# Patient Record
Sex: FEMALE | Race: WHITE | URBAN - METROPOLITAN AREA
[De-identification: names, ages, dates, MRNs, and addresses within clinical notes are randomized per-mention and may not be internally consistent; named-entity substitution may affect disease eponyms.]

---

## 2017-01-03 ENCOUNTER — HOSPITAL ENCOUNTER (INPATIENT)
Facility: CLINIC | Age: 57
LOS: 6 days | Discharge: HOME | DRG: 885 | End: 2017-01-09
Attending: EMERGENCY MEDICINE | Admitting: PSYCHIATRY & NEUROLOGY
Payer: COMMERCIAL

## 2017-01-03 DIAGNOSIS — R45.89 SUICIDAL BEHAVIOR: ICD-10-CM

## 2017-01-03 DIAGNOSIS — F41.1 GENERALIZED ANXIETY DISORDER: ICD-10-CM

## 2017-01-03 DIAGNOSIS — F33.2 SEVERE EPISODE OF RECURRENT MAJOR DEPRESSIVE DISORDER, WITHOUT PSYCHOTIC FEATURES (H): Primary | ICD-10-CM

## 2017-01-03 DIAGNOSIS — F10.20 ALCOHOL DEPENDENCE, CONTINUOUS (H): ICD-10-CM

## 2017-01-03 PROBLEM — F32.A DEPRESSION WITH SUICIDAL IDEATION: Status: ACTIVE | Noted: 2017-01-03

## 2017-01-03 PROBLEM — R45.851 DEPRESSION WITH SUICIDAL IDEATION: Status: ACTIVE | Noted: 2017-01-03

## 2017-01-03 LAB
ALBUMIN SERPL-MCNC: 4.2 G/DL (ref 3.4–5)
ALP SERPL-CCNC: 72 U/L (ref 40–150)
ALT SERPL W P-5'-P-CCNC: 129 U/L (ref 0–50)
AMPHETAMINES UR QL SCN: ABNORMAL
ANION GAP SERPL CALCULATED.3IONS-SCNC: 13 MMOL/L (ref 3–14)
AST SERPL W P-5'-P-CCNC: 194 U/L (ref 0–45)
BARBITURATES UR QL: ABNORMAL
BASOPHILS # BLD AUTO: 0 10E9/L (ref 0–0.2)
BASOPHILS NFR BLD AUTO: 0.7 %
BENZODIAZ UR QL: ABNORMAL
BILIRUB SERPL-MCNC: 0.6 MG/DL (ref 0.2–1.3)
BUN SERPL-MCNC: 10 MG/DL (ref 7–30)
CALCIUM SERPL-MCNC: 9.2 MG/DL (ref 8.5–10.1)
CANNABINOIDS UR QL SCN: ABNORMAL
CHLORIDE SERPL-SCNC: 100 MMOL/L (ref 94–109)
CO2 SERPL-SCNC: 26 MMOL/L (ref 20–32)
COCAINE UR QL: ABNORMAL
CREAT SERPL-MCNC: 0.74 MG/DL (ref 0.52–1.04)
DIFFERENTIAL METHOD BLD: ABNORMAL
EOSINOPHIL # BLD AUTO: 0.1 10E9/L (ref 0–0.7)
EOSINOPHIL NFR BLD AUTO: 2.2 %
ERYTHROCYTE [DISTWIDTH] IN BLOOD BY AUTOMATED COUNT: 13.5 % (ref 10–15)
GFR SERPL CREATININE-BSD FRML MDRD: 82 ML/MIN/1.7M2
GLUCOSE SERPL-MCNC: 92 MG/DL (ref 70–99)
HCT VFR BLD AUTO: 40.3 % (ref 35–47)
HGB BLD-MCNC: 13.8 G/DL (ref 11.7–15.7)
IMM GRANULOCYTES # BLD: 0 10E9/L (ref 0–0.4)
IMM GRANULOCYTES NFR BLD: 0.2 %
LYMPHOCYTES # BLD AUTO: 1.1 10E9/L (ref 0.8–5.3)
LYMPHOCYTES NFR BLD AUTO: 27 %
MCH RBC QN AUTO: 35.6 PG (ref 26.5–33)
MCHC RBC AUTO-ENTMCNC: 34.2 G/DL (ref 31.5–36.5)
MCV RBC AUTO: 104 FL (ref 78–100)
MONOCYTES # BLD AUTO: 0.6 10E9/L (ref 0–1.3)
MONOCYTES NFR BLD AUTO: 13.6 %
NEUTROPHILS # BLD AUTO: 2.3 10E9/L (ref 1.6–8.3)
NEUTROPHILS NFR BLD AUTO: 56.3 %
NRBC # BLD AUTO: 0 10*3/UL
NRBC BLD AUTO-RTO: 0 /100
OPIATES UR QL SCN: ABNORMAL
PCP UR QL SCN: ABNORMAL
PLATELET # BLD AUTO: 188 10E9/L (ref 150–450)
POTASSIUM SERPL-SCNC: 3.9 MMOL/L (ref 3.4–5.3)
PROT SERPL-MCNC: 7.5 G/DL (ref 6.8–8.8)
RBC # BLD AUTO: 3.88 10E12/L (ref 3.8–5.2)
SODIUM SERPL-SCNC: 139 MMOL/L (ref 133–144)
T4 FREE SERPL-MCNC: 1 NG/DL (ref 0.76–1.46)
TSH SERPL DL<=0.005 MIU/L-ACNC: 4.29 MU/L (ref 0.4–4)
WBC # BLD AUTO: 4 10E9/L (ref 4–11)

## 2017-01-03 PROCEDURE — 84443 ASSAY THYROID STIM HORMONE: CPT | Performed by: PSYCHIATRY & NEUROLOGY

## 2017-01-03 PROCEDURE — 12400000 ZZH R&B MH

## 2017-01-03 PROCEDURE — 99285 EMERGENCY DEPT VISIT HI MDM: CPT | Mod: 25

## 2017-01-03 PROCEDURE — 80053 COMPREHEN METABOLIC PANEL: CPT | Performed by: PSYCHIATRY & NEUROLOGY

## 2017-01-03 PROCEDURE — 25000132 ZZH RX MED GY IP 250 OP 250 PS 637: Performed by: PSYCHIATRY & NEUROLOGY

## 2017-01-03 PROCEDURE — 25000125 ZZHC RX 250: Performed by: PSYCHIATRY & NEUROLOGY

## 2017-01-03 PROCEDURE — 80307 DRUG TEST PRSMV CHEM ANLYZR: CPT | Performed by: EMERGENCY MEDICINE

## 2017-01-03 PROCEDURE — 25000132 ZZH RX MED GY IP 250 OP 250 PS 637: Performed by: EMERGENCY MEDICINE

## 2017-01-03 PROCEDURE — 85025 COMPLETE CBC W/AUTO DIFF WBC: CPT | Performed by: PSYCHIATRY & NEUROLOGY

## 2017-01-03 PROCEDURE — HZ2ZZZZ DETOXIFICATION SERVICES FOR SUBSTANCE ABUSE TREATMENT: ICD-10-PCS | Performed by: PSYCHIATRY & NEUROLOGY

## 2017-01-03 PROCEDURE — 84439 ASSAY OF FREE THYROXINE: CPT | Performed by: PSYCHIATRY & NEUROLOGY

## 2017-01-03 PROCEDURE — 90791 PSYCH DIAGNOSTIC EVALUATION: CPT

## 2017-01-03 PROCEDURE — 36415 COLL VENOUS BLD VENIPUNCTURE: CPT | Performed by: PSYCHIATRY & NEUROLOGY

## 2017-01-03 PROCEDURE — 82075 ASSAY OF BREATH ETHANOL: CPT

## 2017-01-03 RX ORDER — ACETAMINOPHEN 500 MG
1000 TABLET ORAL
Status: DISCONTINUED | OUTPATIENT
Start: 2017-01-03 | End: 2017-01-04

## 2017-01-03 RX ORDER — MULTIPLE VITAMINS W/ MINERALS TAB 9MG-400MCG
1 TAB ORAL DAILY
Status: DISCONTINUED | OUTPATIENT
Start: 2017-01-04 | End: 2017-01-09 | Stop reason: HOSPADM

## 2017-01-03 RX ORDER — IBUPROFEN 200 MG
400-600 TABLET ORAL
Status: COMPLETED | OUTPATIENT
Start: 2017-01-03 | End: 2017-01-03

## 2017-01-03 RX ORDER — LORAZEPAM 1 MG/1
1-4 TABLET ORAL EVERY 30 MIN PRN
Status: DISCONTINUED | OUTPATIENT
Start: 2017-01-03 | End: 2017-01-09 | Stop reason: HOSPADM

## 2017-01-03 RX ORDER — ONDANSETRON 4 MG/1
4 TABLET, FILM COATED ORAL EVERY 8 HOURS PRN
Status: DISCONTINUED | OUTPATIENT
Start: 2017-01-03 | End: 2017-01-09 | Stop reason: HOSPADM

## 2017-01-03 RX ORDER — LISINOPRIL 10 MG/1
10 TABLET ORAL DAILY
Status: DISCONTINUED | OUTPATIENT
Start: 2017-01-04 | End: 2017-01-09 | Stop reason: HOSPADM

## 2017-01-03 RX ORDER — FOLIC ACID 1 MG/1
1 TABLET ORAL DAILY
Status: DISCONTINUED | OUTPATIENT
Start: 2017-01-04 | End: 2017-01-09 | Stop reason: HOSPADM

## 2017-01-03 RX ORDER — LANOLIN ALCOHOL/MO/W.PET/CERES
100 CREAM (GRAM) TOPICAL DAILY
Status: COMPLETED | OUTPATIENT
Start: 2017-01-04 | End: 2017-01-08

## 2017-01-03 RX ORDER — QUETIAPINE FUMARATE 25 MG/1
25-50 TABLET, FILM COATED ORAL EVERY 6 HOURS PRN
Status: DISCONTINUED | OUTPATIENT
Start: 2017-01-03 | End: 2017-01-09 | Stop reason: HOSPADM

## 2017-01-03 RX ORDER — LORAZEPAM 2 MG/ML
1-4 INJECTION INTRAMUSCULAR EVERY 30 MIN PRN
Status: DISCONTINUED | OUTPATIENT
Start: 2017-01-03 | End: 2017-01-09 | Stop reason: HOSPADM

## 2017-01-03 RX ADMIN — LORAZEPAM 1 MG: 1 TABLET ORAL at 22:12

## 2017-01-03 RX ADMIN — ONDANSETRON 4 MG: 4 TABLET, FILM COATED ORAL at 22:12

## 2017-01-03 RX ADMIN — IBUPROFEN 600 MG: 200 TABLET, FILM COATED ORAL at 20:36

## 2017-01-03 NOTE — ED NOTES
Bed: [facility]  Expected date:   Expected time:   Means of arrival:   Comments:  suicidal
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
## 2017-01-03 NOTE — IP AVS SNAPSHOT
[place] [address] [place] [address]    Phone:  [phone]                                       After Visit Summary   1/3/2017    Isabel Fallon    MRN: [id number]           After Visit Summary Signature Page     I have received my discharge instructions, and my questions have been answered. I have discussed any challenges I see with this plan with the nurse or doctor.    ..........................................................................................................................................  Patient/Patient Representative Signature      ..........................................................................................................................................  Patient Representative Print Name and Relationship to Patient    ..................................................               ................................................  Date                                            Time    ..........................................................................................................................................  Reviewed by Signature/Title    ...................................................              ..............................................  Date                                                            Time

## 2017-01-04 PROCEDURE — 25000132 ZZH RX MED GY IP 250 OP 250 PS 637: Performed by: PSYCHIATRY & NEUROLOGY

## 2017-01-04 PROCEDURE — 25000132 ZZH RX MED GY IP 250 OP 250 PS 637: Performed by: PHYSICIAN ASSISTANT

## 2017-01-04 PROCEDURE — 99222 1ST HOSP IP/OBS MODERATE 55: CPT | Mod: AI | Performed by: PHYSICIAN ASSISTANT

## 2017-01-04 PROCEDURE — 12400006 ZZH R&B MH INTERMEDIATE

## 2017-01-04 PROCEDURE — 97150 GROUP THERAPEUTIC PROCEDURES: CPT | Mod: GO

## 2017-01-04 RX ORDER — BUTALBITAL, ACETAMINOPHEN AND CAFFEINE 50; 325; 40 MG/1; MG/1; MG/1
1 TABLET ORAL EVERY 4 HOURS PRN
COMMUNITY
End: 2017-08-11

## 2017-01-04 RX ORDER — BUTALBITAL, ASPIRIN AND CAFFEINE 50; 325; 40 MG/1; MG/1; MG/1
1 TABLET ORAL EVERY 4 HOURS PRN
Status: DISCONTINUED | OUTPATIENT
Start: 2017-01-04 | End: 2017-01-09 | Stop reason: HOSPADM

## 2017-01-04 RX ORDER — MIRTAZAPINE 15 MG/1
15 TABLET, FILM COATED ORAL AT BEDTIME
Status: DISCONTINUED | OUTPATIENT
Start: 2017-01-04 | End: 2017-01-09 | Stop reason: HOSPADM

## 2017-01-04 RX ADMIN — LORAZEPAM 1 MG: 1 TABLET ORAL at 20:49

## 2017-01-04 RX ADMIN — OMEPRAZOLE 20 MG: 20 CAPSULE, DELAYED RELEASE ORAL at 08:38

## 2017-01-04 RX ADMIN — MULTIPLE VITAMINS W/ MINERALS TAB 1 TABLET: TAB at 08:38

## 2017-01-04 RX ADMIN — MIRTAZAPINE 15 MG: 15 TABLET, FILM COATED ORAL at 20:49

## 2017-01-04 RX ADMIN — LORAZEPAM 1 MG: 1 TABLET ORAL at 09:42

## 2017-01-04 RX ADMIN — VORTIOXETINE 5 MG: 5 TABLET, FILM COATED ORAL at 13:59

## 2017-01-04 RX ADMIN — Medication 100 MG: at 08:38

## 2017-01-04 RX ADMIN — LISINOPRIL 10 MG: 10 TABLET ORAL at 08:38

## 2017-01-04 RX ADMIN — FOLIC ACID 1 MG: 1 TABLET ORAL at 08:38

## 2017-01-04 ASSESSMENT — ACTIVITIES OF DAILY LIVING (ADL)
GROOMING: INDEPENDENT
DRESS: SCRUBS (BEHAVIORAL HEALTH)
ORAL_HYGIENE: INDEPENDENT

## 2017-01-04 NOTE — H&P
Pt seen for initial psychiatric evaluation, please see my dictation for details and recommendations.

## 2017-01-04 NOTE — PLAN OF CARE
Problem: Depressive Symptoms  Goal: Depressive Symptoms  Signs and symptoms of listed problems will be absent or manageable.   Outcome: No Change  Pt has been isolative to her room through the majority of the shift. Pt has been fairly guarded but does brighten up upon approach. Pt is concerned about stressors at home especially her . Pt talked about her drinking how this has not helped her. PT is blunt and flat and at time tearful. Pt though is directable and pleasant. Pt to have a CD assessment.

## 2017-01-04 NOTE — PROGRESS NOTES
01/03/17 2213   Patient Belongings   Patient Belongings clothing;other (see comments)   Disposition of Belongings Locker   Belongings Search Yes   Clothing Search Yes   Second Staff Claudio     Jacket  Pajama bottoms w/ strings  Pajama top  Socks  Bra  Underwear  Books x 2      ADMIT  SIGNATURE_______________________________________DATE____________________      DISCHARGE  SIGNATURE_______________________________________DATE____________________

## 2017-01-04 NOTE — CONSULTS
"BEHAVIORAL HEALTH NUTRITION ASSESSMENT      REASON FOR ASSESSMENT:  Admission Nutrition Risk Screen - unintentional weight loss of 10# or more in the past 2 months.     CURRENT DIET AND NOURISHMENT ORDER:    Information obtained from Chart Review.   Pt presents to [facility] for depression with suicidal ideation. Pt has been drinking heavily since September, and regularly drinks wine until she passes out. Suspect etoh intakes have taken place of food intakes.     Diet: Regular    Current Intake/Tolerance: Per physician, pt endorses nausea currently.  No intake recorded on the doc flowsheet. Patient not available/not appropriate to visit with at this time. Patient ordering adequate amounts of food and balanced meals as per review of menus.    ANTHROPOMETRICS:    Height: 5' 6\"  Weight: 62.2 kg  BMI: 22.16 kg/m2  IBW: 59 kg  %IBW: 105%  Weight History: unable to determine recent changes in weight.   Wt Readings from Last 10 Encounters:   01/03/17 62.234 kg (137 lb 3.2 oz)   02/29/12 67 kg (147 lb 11.3 oz)   10/11/11 63.1 kg (139 lb 1.8 oz)     LABS:  Reviewed    NUTRITION STATUS VALIDATION:  Weight status: Normal BMI  Patient does not meet two of the following criteria necessary for diagnosing malnutrition (significant weight loss, reduced intake, subcutaneous fat loss, muscle loss or fluid retention)    INTERVENTION:    Nutrition Diagnosis:  No nutrition diagnosis at this time.    Implementation:   Nutrition education: Not appropriate at this time due to patient condition    Follow Up/Monitoring:   No need for further follow-up unless another consult received.    Tara Fowler, RD, LD  "
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
## 2017-01-04 NOTE — PROGRESS NOTES
Pt was admitted through [facility] ED on a voluntary status.  Endorses passive SI and increasing anxiety.  Pt presents as labile, depressed and extremely anxious.  Pt feels thing have been declining since  with her  several months ago and has been drinking since.  Reports drinking wine daily until she passes out.  Pt is actively seeking help.  She reports that hugs will help her to feel better. Hx PTSD, TBI, and anxiety. Verbally contracts for safety while on the unit.    Welcome packet reviewed with patient. Information reviewed includes getting emergency help, preventing infections, understanding your care, using medication safely, reducing falls, preventing pressure ulcers, smoking cessation, powerful choices and Patients Bill of Rights. Pt. given tour of the unit and instruction on use of facility including emergency call light. Program schedule reviewed with patient. Questions regarding the unit addressed. Pt. Search completed and belongings inventoried. Nursing assessment complete including patient and medication profiles. Risk assessments completed addressing suicide,fall,skin,nutrition and safety issues. Care plan initiated. Assessments reviewed with physician and admit orders received.
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
## 2017-01-04 NOTE — PLAN OF CARE
"Problem: General Rehab Plan of Care  Goal: Occupational Therapy Goals  The patient and/or their representative will achieve their patient-specific goals related to the plan of care.  The patient-specific goals include:  INITIAL O.T. ASSESSMENT   Details:  Pt was invited to OT group this morning but declined stating, \"I'm going through alcohol withdrawal.\" Her response was quite honest and showed some insight into her current condition. Pt did attend OT Life Skills group. She appeared uncomfortable during group and primarily observed. She did not engage in group discussion but did occasionally nod in agreement. Pt took part in group mindfulness exercise, following directions accurately. Behavior appeared organized and goal directed. Pt solved minor problem she encountered easily. Pt was alert and appeared attentive during group, though perhaps distracted occasionally by her discomfort.         "

## 2017-01-04 NOTE — ED NOTES
Pt given more water to drink. Ambulated to bathroom with steady gait but c/o dizziness. C/o HA that pt is concerned will get worse. Unsure of medication she normally takes for it.

## 2017-01-05 PROCEDURE — 12400006 ZZH R&B MH INTERMEDIATE

## 2017-01-05 PROCEDURE — 25000132 ZZH RX MED GY IP 250 OP 250 PS 637: Performed by: PHYSICIAN ASSISTANT

## 2017-01-05 PROCEDURE — 90791 PSYCH DIAGNOSTIC EVALUATION: CPT

## 2017-01-05 PROCEDURE — 90853 GROUP PSYCHOTHERAPY: CPT

## 2017-01-05 PROCEDURE — 25000132 ZZH RX MED GY IP 250 OP 250 PS 637: Performed by: PSYCHIATRY & NEUROLOGY

## 2017-01-05 RX ADMIN — MIRTAZAPINE 15 MG: 15 TABLET, FILM COATED ORAL at 20:51

## 2017-01-05 RX ADMIN — LORAZEPAM 1 MG: 1 TABLET ORAL at 14:46

## 2017-01-05 RX ADMIN — FOLIC ACID 1 MG: 1 TABLET ORAL at 08:00

## 2017-01-05 RX ADMIN — OMEPRAZOLE 20 MG: 20 CAPSULE, DELAYED RELEASE ORAL at 08:00

## 2017-01-05 RX ADMIN — LISINOPRIL 10 MG: 10 TABLET ORAL at 08:00

## 2017-01-05 RX ADMIN — LORAZEPAM 1 MG: 1 TABLET ORAL at 08:32

## 2017-01-05 RX ADMIN — MULTIPLE VITAMINS W/ MINERALS TAB 1 TABLET: TAB at 08:00

## 2017-01-05 RX ADMIN — VORTIOXETINE 5 MG: 5 TABLET, FILM COATED ORAL at 08:00

## 2017-01-05 RX ADMIN — Medication 100 MG: at 08:00

## 2017-01-05 ASSESSMENT — ACTIVITIES OF DAILY LIVING (ADL)
GROOMING: INDEPENDENT
DRESS: STREET CLOTHES
LAUNDRY: WITH SUPERVISION
ORAL_HYGIENE: INDEPENDENT
ORAL_HYGIENE: INDEPENDENT
DRESS: STREET CLOTHES
GROOMING: INDEPENDENT
LAUNDRY: WITH SUPERVISION
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
## 2017-01-05 NOTE — PLAN OF CARE
Problem: Depressive Symptoms  Goal: Depressive Symptoms  Signs and symptoms of listed problems will be absent or manageable.   Patient scores on CIWA x2 mainly for anxiety she states she has a headache but is due to another peers loud intrusive behavior. Pt has been attending groups and denies SI. Pt's daughter called and states that her mother text someone while she was intoxicated and said she had 3 days picked to commit suicide and the first day was this Saturday. Pt was asked about this and states that this was her intoxications and she does not feel that way now and is more hopeful and goal directed that she is getting the help she needs. She has follow up in place and wants to do O.P. CD ( assessment still pending) Pt's daughter Massiel would like a family meeting before discharge and pt is in agreement to this. Note left for MD. Her stressors are pending divorce and  is asking for spousal maintainace

## 2017-01-05 NOTE — PROGRESS NOTES
"SPIRITUAL HEALTH SERVICES Progress Note  [facility]    Initial visit per pt request.  Pt shared many details about her life related to her: current divorce proceedings, \"enabling\" behavior, use of alcohol for coping, work ethic and rickey.  Pt states an appreciation for support/counse that is rickey-based (including scripture verses/stories).  Pt said she likes her current therapist, and also engaged this conversation with openness to continue to explore her own behavior and its effectiveness.     provided input on co-dependency and alcohol use/abuse aimed at encouraging pt to seek out support in the appropriate recovery setting (potentially to include participation 12-Step groups).   provided several scriptural references aimed at helping pt to trust in God's support and guidance through all of the above named concerns.  And, per pt's comments about her divorce, SH invited pt to consider that being caring/faithful can include pursuing a fair/just divorce settlement.   closed this visit w prayer; and remains available per pt need/request.                                                                                                                                           Gilmer Guillermo M.Div., [facility]  Staff   Pager [phone]    "

## 2017-01-05 NOTE — PLAN OF CARE
Problem: Discharge Planning  Goal: Discharge Planning (Adult, OB, Behavioral, Peds)  Patient attended Process Group and participated appropriately. Patient demonstrated good insight into the topic of discussion.

## 2017-01-05 NOTE — PLAN OF CARE
Problem: Depressive Symptoms  Goal: Depressive Symptoms  Signs and symptoms of listed problems will be absent or manageable.   Outcome: No Change  Pt was pleased to have a visitor this evening. Pt has episodes of light sweating and has needed a change of scrubs, but declined shower. CIWA 4 and received lorazepam 1mg. Fine hand and tongue tremor present; states withdrawal is uncomfortable. Pt expressed guilt feelings about ETOH withdrawal.  Isolative, reading in room , is pleasant and polite.

## 2017-01-05 NOTE — PROGRESS NOTES
[facility] Psychiatric Progress Note      Interval History:   Pt seen, team meeting held with , nursing staff, OT, and PA's to review diagnosis and treatment plan. Patient reports she is doing a tad better but she continues to report discomfort with regard to her alcohol withdrawal. She denies current self harm thoughts or plans. She says she enjoyed meeting with the  today but she is looking forward to her outpatient care. She says she does not think the groups are very beneficial and so she is looking forward to reconnecting with her outpatient providers. She has not talked about having any plans to end her life on any of the dates reported prior to admission. She says she just cannot get herself to agree to pay spousal support to her . Tolerating medications well without significant side effects. She is yet to be seen by CD.       Review of systems:   The Review of Systems is negative other than noted in the HPI     Medications:       mirtazapine  15 mg Oral At Bedtime     vortioxetine  5 mg Oral Daily     thiamine  100 mg Oral Daily     folic acid  1 mg Oral Daily     multivitamin, therapeutic with minerals  1 tablet Oral Daily     lisinopril  10 mg Oral Daily     omeprazole  20 mg Oral Daily     butalbital-aspirin-caffeine, LORazepam **OR** LORazepam, QUEtiapine, ondansetron (ZOFRAN) tablet 4 mg    Mental Status Examination:     Appearance:  awake, alert, adequately groomed and casually dressed  Eye Contact:  better  Speech:  clear, coherent  Psychomotor Behavior:  no evidence of tardive dyskinesia, dystonia, or tics and fidgeting  Mood:  Sad and anxious.  Affect:  Flat and restricted in range and intensity is normal  Thought Process:  logical, linear and goal oriented no loose associations  Thought Content:  no evidence of suicidal ideation or homicidal ideation   Oriented to:  time, person, and place  Attention Span and Concentration:  limited  Recent and Remote  Memory:  limited  Fund of Knowledge: appropriate  Muscle Strength and Tone: normal  Gait and Station: Normal  Insight:  fair  Judgment:  limited      Labs/Vitals:   No results found for this or any previous visit (from the past 24 hour(s)).  B/P: 120/81, T: 97.6, P: 83, R: 15    Impression:   Isabel Fallon is a 56-year-old woman with multiple psychosocial stressors who presents on account of entertaining self-harm thoughts in the context of multiple stressors, including separation from .           DIagnoses:     1.  Major depressive disorder, recurrent, severe, without psychotic features.    2.  Posttraumatic stress disorder, chronic.    3.  Alcohol use disorder, severe, with alcohol withdrawal.    4.  History of traumatic brain injury.    5.  Hypertension.    6.  Gastroesophageal reflux disease.    7.  Chronic headaches.           Plan:   1. Written information given on medications. Side effects, risks, benefits reviewed.  2. CD assessment.  3. Maintain current medications as ordered.  4. Continue hospitalization      Attestation:  Patient has been seen and evaluated by Annabel dickson MD    PATIENT ID  Name: Isabel Fallon  MRN:[id number]  YOB: 1960

## 2017-01-06 PROCEDURE — 97150 GROUP THERAPEUTIC PROCEDURES: CPT | Mod: GO

## 2017-01-06 PROCEDURE — 25000132 ZZH RX MED GY IP 250 OP 250 PS 637: Performed by: PSYCHIATRY & NEUROLOGY

## 2017-01-06 PROCEDURE — 90853 GROUP PSYCHOTHERAPY: CPT

## 2017-01-06 PROCEDURE — 25000132 ZZH RX MED GY IP 250 OP 250 PS 637: Performed by: PHYSICIAN ASSISTANT

## 2017-01-06 PROCEDURE — 12400000 ZZH R&B MH

## 2017-01-06 RX ORDER — HYDROXYZINE HYDROCHLORIDE 25 MG/1
25-50 TABLET, FILM COATED ORAL EVERY 4 HOURS PRN
Status: DISCONTINUED | OUTPATIENT
Start: 2017-01-06 | End: 2017-01-09 | Stop reason: HOSPADM

## 2017-01-06 RX ORDER — SENNOSIDES 8.6 MG
8.6 TABLET ORAL 2 TIMES DAILY PRN
Status: DISCONTINUED | OUTPATIENT
Start: 2017-01-06 | End: 2017-01-09 | Stop reason: HOSPADM

## 2017-01-06 RX ADMIN — OMEPRAZOLE 20 MG: 20 CAPSULE, DELAYED RELEASE ORAL at 08:17

## 2017-01-06 RX ADMIN — QUETIAPINE FUMARATE 25 MG: 25 TABLET, FILM COATED ORAL at 09:25

## 2017-01-06 RX ADMIN — FOLIC ACID 1 MG: 1 TABLET ORAL at 08:17

## 2017-01-06 RX ADMIN — VORTIOXETINE 5 MG: 5 TABLET, FILM COATED ORAL at 08:17

## 2017-01-06 RX ADMIN — Medication 100 MG: at 08:17

## 2017-01-06 RX ADMIN — MULTIPLE VITAMINS W/ MINERALS TAB 1 TABLET: TAB at 08:17

## 2017-01-06 RX ADMIN — MIRTAZAPINE 15 MG: 15 TABLET, FILM COATED ORAL at 21:29

## 2017-01-06 RX ADMIN — LISINOPRIL 10 MG: 10 TABLET ORAL at 08:17

## 2017-01-06 NOTE — PROGRESS NOTES
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 specific to language, age, gender, culture, Nondenominational preference, race, ethnicity, sexual orientation or disability?  No    What particular treatment choices and options would you like to have? outpatient    Do you have a preference for a particular treatment program? outpatient    Criteria for Diagnosis     Criteria for Diagnosis  DSM-5 Criteria for Substance Use Disorder  Instructions: Determine whether the client currently meets the criteria for Substance Use Disorder using the diagnostic criteria in the DSM-V pp.481-584. Current means during the most recent 12 months outside a facility that controls access to substances    Category of Substance Severity (ICD-10 Code / DSM 5 Code)     Alcohol Use Disorder Moderate  (F10.20) (303.90)   Cannabis Use Disorder NA   Hallucinogen Use Disorder NA   Inhalant Use Disorder NA   Opioid Use Disorder NA   Sedative, Hypnotic, or Anxiolytic Use Disorder NA   Stimulant Related Disorder NA   Tobacco Use Disorder NA   Other (or unknown) Substance Use Disorder NA       Collateral Contact Summary   Number of contacts made: 1    Contact with referring person:  Yes.    If court related records were reviewed, summarize here: NA    Information from collateral  contacts supported/largely agreed with information from the client and associated risk ratings.      Rule 25 Assessment Summary and Plan   's Recommendation    Patient is recommended to attend a co-occurring outpatient treatment program.      Collateral Contacts     Name:    [facility]   Relationship:    Medical records   Phone Number:     Releases:         EMR was reviewed and discussed care plan with staff.      Collateral Contacts     Name:    n/a   Relationship:       Phone Number:       Releases:             ollateral Contacts      A problematic pattern of alcohol/drug use leading to clinically significant impairment or distress, as manifested by at least two of the following, occurring within a 12-month period:    Alcohol/drug is often taken in larger amounts or over a longer period than was intended.  A great deal of time is spent in activities necessary to obtain alcohol, use alcohol, or recover from its effects.  Continued alcohol use despite having persistent or recurrent social or interpersonal problems caused or exacerbated by the effects of alcohol/drug.  Alcohol/drug use is continued despite knowledge of having a persistent or recurrent physical or psychological problem that is likely to have been caused or exacerbated by alcohol.  Tolerance, as defined by either of the following: A need for markedly increased amounts of alcohol/drug to achieve intoxication or desired effect. and A markedly diminished effect with continued use of the same amount of alcohol/drug.      Specify if: In early remission:  After full criteria for alcohol/drug use disorder were previously met, none of the criteria for alcohol/drug use disorder have been met for at least 3 months but for less than 12 months (with the exception that Criterion A4,  Craving or a strong desire or urge to use alcohol/drug  may be met).     In sustained remission:   After full criteria for alcohol use disorder were previously  met, non of the criteria for alcohol/drug use disorder have been met at any time during a period of 12 months or longer (with the exception that Criterion A4,  Craving or strong desire or urge to use alcohol/drug  may be met).   Specify if:   This additional specifier is used if the individual is in an environment where access to alcohol is restricted.    Mild: Presence of 2-3 symptoms    Moderate: Presence of 4-5 symptoms    Severe: Presence of 6 or more symptoms

## 2017-01-06 NOTE — PLAN OF CARE
Problem: Depressive Symptoms  Goal: Depressive Symptoms  Signs and symptoms of listed problems will be absent or manageable.   Patient does not score on CIWA and states she feels much better being over on this side. Pt pleasant ,no c/o headache as on day shift. Adjusting well to SDU and has good structure for DC. Family meeting requested before DC

## 2017-01-06 NOTE — PLAN OF CARE
Problem: Discharge Planning  Goal: Discharge Planning (Adult, OB, Behavioral, Peds)   called and spoke with patient's daughter, Massiel Addison ([phone]) regarding attending a family meeting on Monday 01/09/16 at 1000. She stated that she runs an in-home  so she would not be able to attend. She is requesting that physician call and talk to her on Monday instead.

## 2017-01-06 NOTE — PROGRESS NOTES
[facility] Psychiatric Progress Note      Interval History:   Pt seen, team meeting held with , nursing staff, OT, and PA's to review diagnosis and treatment plan. Patient reports she is feeling very anxious this morning but she denies that she is tremulous or having any alcohol withdrawal symptoms. She is yet to get her CD assessment.  She is sad and depressed. Tolerating medications well without significant side effects. She denies self harm thoughts, plans or intent. Her daughter is requesting a discharge family meeting. Discussed the option of MI/CD IOP referral.     Review of systems:   The Review of Systems is negative other than noted in the HPI     Medications:       [START ON 1/7/2017] vortioxetine  10 mg Oral Daily     mirtazapine  15 mg Oral At Bedtime     thiamine  100 mg Oral Daily     folic acid  1 mg Oral Daily     multivitamin, therapeutic with minerals  1 tablet Oral Daily     lisinopril  10 mg Oral Daily     omeprazole  20 mg Oral Daily     hydrOXYzine, butalbital-aspirin-caffeine, LORazepam **OR** LORazepam, QUEtiapine, ondansetron (ZOFRAN) tablet 4 mg    Mental Status Examination:     Appearance:  awake, alert, adequately groomed and casually dressed  Eye Contact:  better  Speech:  clear, coherent  Psychomotor Behavior:  no evidence of tardive dyskinesia, dystonia, or tics and fidgeting. Tearful.  Mood:  Sad and anxious.  Affect:  Flat and restricted in range and intensity is normal  Thought Process:  logical, linear and goal oriented no loose associations  Thought Content:  no evidence of suicidal ideation or homicidal ideation   Oriented to:  time, person, and place  Attention Span and Concentration:  limited  Recent and Remote Memory:  limited  Fund of Knowledge: appropriate  Muscle Strength and Tone: normal  Gait and Station: Normal  Insight:  fair  Judgment:  limited      Labs/Vitals:   No results found for this or any previous visit (from the past 24  hour(s)).  B/P: 120/81, T: 97.6, P: 83, R: 15    Impression:   Isabel Fallon is a 56-year-old woman with multiple psychosocial stressors who presents on account of entertaining self-harm thoughts in the context of multiple stressors, including separation from .           DIagnoses:     1.  Major depressive disorder, recurrent, severe, without psychotic features.    2.  Posttraumatic stress disorder, chronic.    3.  Alcohol use disorder, severe, with alcohol withdrawal.    4.  History of traumatic brain injury.    5.  Hypertension.    6.  Gastroesophageal reflux disease.    7.  Chronic headaches.           Plan:   1. Written information given on medications. Side effects, risks, benefits reviewed.  2. CD assessment.  3. Increase Trintellix to 10 mg daily.  4. Continue hospitalization      Attestation:  Patient has been seen and evaluated by me,  Annabel Choe MD    PATIENT ID  Name: Isabel Fallon  MRN:[id number]  YOB: 1960

## 2017-01-06 NOTE — CONSULTS
1/6/16 cd consult acknowledged.  Recommending outpatient treatment which patient is agreeable.  Spoke w/ Dr. Choe and patient regarding referral for [facility].
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
## 2017-01-06 NOTE — PLAN OF CARE
"Problem: Depressive Symptoms  Goal: Depressive Symptoms  Signs and symptoms of listed problems will be absent or manageable.   Outcome: No Change  Patient presents as anxious and depressed. She stated \"I am feeling very anxious\" ans she discussed missing her family members. She had a CD assessment this afternoon. Patient appears sad and was tearful at times. She attended groups and participated minimally. Patient required  reassurance and started to feel better this afternoon. Overall she is pleasant and cooperative.         "

## 2017-01-07 PROCEDURE — 25000132 ZZH RX MED GY IP 250 OP 250 PS 637: Performed by: PSYCHIATRY & NEUROLOGY

## 2017-01-07 PROCEDURE — 25000132 ZZH RX MED GY IP 250 OP 250 PS 637: Performed by: PHYSICIAN ASSISTANT

## 2017-01-07 PROCEDURE — 12400000 ZZH R&B MH

## 2017-01-07 RX ADMIN — LISINOPRIL 10 MG: 10 TABLET ORAL at 08:02

## 2017-01-07 RX ADMIN — MIRTAZAPINE 15 MG: 15 TABLET, FILM COATED ORAL at 22:14

## 2017-01-07 RX ADMIN — Medication 100 MG: at 08:02

## 2017-01-07 RX ADMIN — VORTIOXETINE 10 MG: 10 TABLET, FILM COATED ORAL at 08:02

## 2017-01-07 RX ADMIN — FOLIC ACID 1 MG: 1 TABLET ORAL at 08:02

## 2017-01-07 RX ADMIN — OMEPRAZOLE 20 MG: 20 CAPSULE, DELAYED RELEASE ORAL at 08:02

## 2017-01-07 RX ADMIN — QUETIAPINE FUMARATE 50 MG: 25 TABLET, FILM COATED ORAL at 11:50

## 2017-01-07 RX ADMIN — QUETIAPINE FUMARATE 50 MG: 25 TABLET, FILM COATED ORAL at 18:14

## 2017-01-07 RX ADMIN — MULTIPLE VITAMINS W/ MINERALS TAB 1 TABLET: TAB at 08:02

## 2017-01-07 ASSESSMENT — ACTIVITIES OF DAILY LIVING (ADL)
LAUNDRY: WITH SUPERVISION
ORAL_HYGIENE: INDEPENDENT
DRESS: STREET CLOTHES
GROOMING: INDEPENDENT

## 2017-01-07 NOTE — PLAN OF CARE
Problem: Depressive Symptoms  Goal: Depressive Symptoms  Signs and symptoms of listed problems will be absent or manageable.   Outcome: Improving  Pt has been spending manjit out in the lounge and socializing more with her peers. Pt attended some of the groups but was disappointed today in one group in which she felt it was not helpful and she felt like she did not learn anything from them. Pt was encouraged to continue to go to groups and look to finding some supportive things in the groups. Pt was also explained that groups are often used to help staff assess the functionality of a person and whether they are capable of integrating and socializing in a structured format. Pt did understand this and explained her DC plan. Pt wants to use AA to address her cd issues however has never used AA nor does she understand what AA is. Pt was explained about the importance of meeting and encouraging her to read the big book. Pt was also thinking she may need more 1:1 CD tx. Pt was encourage to focus on simple tasks in her life and work on herself and ways to self care. Pt does have motivation and insight. Pt did have a good visit what friends and was happy to see her dog., Pt stated that she is glad that her dog is doing well and is recovering well. This is a relief for her as  The pt is very attached to her dog. Pt to continue with AD tx and follow up at [facility] along with psychiatry at [place].

## 2017-01-07 NOTE — PLAN OF CARE
Problem: Depressive Symptoms  Goal: Depressive Symptoms  Signs and symptoms of listed problems will be absent or manageable.   Patient presents depressed and anxious. She stated that she feels overwhelmed about what the doctor's recommendations will be once she leaves. Patient stated that she does not feel she needs inpatient treat or intensive outpatient as this would restrict her from working. Patient stated that if she couldn't return to work after discharge then she would never get better and it would make her anxiety significantly worse. Patient denies chemical dependency issues. Patient was cooperative with care plan and pleasant with staff.
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
## 2017-01-08 PROCEDURE — 90853 GROUP PSYCHOTHERAPY: CPT

## 2017-01-08 PROCEDURE — 25000132 ZZH RX MED GY IP 250 OP 250 PS 637: Performed by: PSYCHIATRY & NEUROLOGY

## 2017-01-08 PROCEDURE — 25000132 ZZH RX MED GY IP 250 OP 250 PS 637: Performed by: PHYSICIAN ASSISTANT

## 2017-01-08 PROCEDURE — 12400000 ZZH R&B MH

## 2017-01-08 RX ADMIN — VORTIOXETINE 10 MG: 10 TABLET, FILM COATED ORAL at 08:28

## 2017-01-08 RX ADMIN — MULTIPLE VITAMINS W/ MINERALS TAB 1 TABLET: TAB at 08:27

## 2017-01-08 RX ADMIN — OMEPRAZOLE 20 MG: 20 CAPSULE, DELAYED RELEASE ORAL at 08:27

## 2017-01-08 RX ADMIN — LISINOPRIL 10 MG: 10 TABLET ORAL at 08:27

## 2017-01-08 RX ADMIN — FOLIC ACID 1 MG: 1 TABLET ORAL at 08:27

## 2017-01-08 RX ADMIN — Medication 100 MG: at 08:27

## 2017-01-08 RX ADMIN — MIRTAZAPINE 15 MG: 15 TABLET, FILM COATED ORAL at 22:00

## 2017-01-08 ASSESSMENT — ACTIVITIES OF DAILY LIVING (ADL)
GROOMING: INDEPENDENT
ORAL_HYGIENE: INDEPENDENT
DRESS: INDEPENDENT
LAUNDRY: WITH SUPERVISION

## 2017-01-08 NOTE — PLAN OF CARE
Problem: Depressive Symptoms  Goal: Depressive Symptoms  Signs and symptoms of listed problems will be absent or manageable.   Patient has been social with peers. She presents with full range affect. C/o anxiety. Denies Si and SIB. Feeling confident abut outpatient treatment at NH.

## 2017-01-08 NOTE — PROGRESS NOTES
"SPIRITUAL HEALTH SERVICES Progress Note  [facility]    F/U visit.  Pt attended the Sunday Spirituality group led by , then asked to speak with SH 1:1.  Pt shared her preference for out-patient CD treatment rather than inpatient, and reasoned that the former could both allow her to return to work and to \"prove herself\" to family.  SH emphasized that engaging recovery work in either setting more than \"proves\" her desire to develop new/healthier coping habits.  Per pt's rickey, SH also emphasized that God will join/support her recovery work in either setting.   also provided prayer as requested by pt.                                                                                                                                           Gilmer Guillermo M.Div., [facility]  Staff   Pager [phone]    "

## 2017-01-08 NOTE — PLAN OF CARE
Problem: Depressive Symptoms  Goal: Depressive Symptoms  Signs and symptoms of listed problems will be absent or manageable.   Outcome: No Change  Pt has been present on the unit and has been spending time in the lounge and socializing with peers. Pt  has been pleasant and cooperative through out the shift. Pt is hopeful for DC Monday

## 2017-01-09 VITALS
DIASTOLIC BLOOD PRESSURE: 89 MMHG | RESPIRATION RATE: 16 BRPM | WEIGHT: 137.2 LBS | OXYGEN SATURATION: 98 % | HEART RATE: 96 BPM | BODY MASS INDEX: 22.05 KG/M2 | HEIGHT: 66 IN | TEMPERATURE: 98 F | SYSTOLIC BLOOD PRESSURE: 118 MMHG

## 2017-01-09 PROCEDURE — 25000132 ZZH RX MED GY IP 250 OP 250 PS 637: Performed by: PHYSICIAN ASSISTANT

## 2017-01-09 PROCEDURE — 90847 FAMILY PSYTX W/PT 50 MIN: CPT

## 2017-01-09 PROCEDURE — 25000132 ZZH RX MED GY IP 250 OP 250 PS 637: Performed by: PSYCHIATRY & NEUROLOGY

## 2017-01-09 PROCEDURE — 90853 GROUP PSYCHOTHERAPY: CPT

## 2017-01-09 RX ORDER — HYDROXYZINE HYDROCHLORIDE 25 MG/1
25-50 TABLET, FILM COATED ORAL EVERY 8 HOURS PRN
Qty: 120 TABLET | Refills: 0 | Status: SHIPPED | OUTPATIENT
Start: 2017-01-09 | End: 2017-02-08

## 2017-01-09 RX ORDER — DISULFIRAM 250 MG/1
250 TABLET ORAL DAILY
Qty: 30 TABLET | Refills: 0 | Status: SHIPPED | OUTPATIENT
Start: 2017-01-09 | End: 2017-02-08

## 2017-01-09 RX ORDER — QUETIAPINE FUMARATE 25 MG/1
25 TABLET, FILM COATED ORAL EVERY 6 HOURS PRN
Qty: 60 TABLET | Refills: 0 | Status: SHIPPED | OUTPATIENT
Start: 2017-01-09 | End: 2017-08-11

## 2017-01-09 RX ORDER — MIRTAZAPINE 15 MG/1
15 TABLET, FILM COATED ORAL AT BEDTIME
Qty: 30 TABLET | Refills: 0 | Status: SHIPPED | OUTPATIENT
Start: 2017-01-09 | End: 2017-08-11

## 2017-01-09 RX ADMIN — QUETIAPINE FUMARATE 50 MG: 25 TABLET, FILM COATED ORAL at 09:08

## 2017-01-09 RX ADMIN — LISINOPRIL 10 MG: 10 TABLET ORAL at 08:10

## 2017-01-09 RX ADMIN — FOLIC ACID 1 MG: 1 TABLET ORAL at 08:10

## 2017-01-09 RX ADMIN — MULTIPLE VITAMINS W/ MINERALS TAB 1 TABLET: TAB at 08:10

## 2017-01-09 RX ADMIN — VORTIOXETINE 10 MG: 10 TABLET, FILM COATED ORAL at 08:10

## 2017-01-09 RX ADMIN — OMEPRAZOLE 20 MG: 20 CAPSULE, DELAYED RELEASE ORAL at 08:10

## 2017-01-09 NOTE — DISCHARGE INSTRUCTIONS
Behavioral Discharge Planning and Instructions    Summary: Admitted to hospital with suicidal ideation.    Main Diagnosis:      Major Treatments, Procedures and Findings: psychiatric assessment    Symptoms to Report: feeling more aggressive, losing more sleep, mood getting worse or thoughts of suicide    Lifestyle Adjustment: Develop and follow safety plan. Follow through with therapy and mdication management . Follow up with [facility] as recommended by .    Psychiatry Follow-up:     Program intake on Tuesday 1/10/17 @ 9 am. Bring insurance card and ID.  [facility]  [address]  [place] [address]  [phone] fax: [phone]    Adarsh Malin, CNS- appointment on Monday 1/30/17 @ 1:30 for paperwork and 2 pm for medication management interview.  [facility]  [address] [place] [address]  [phone]  Fax: [phone]    Juan Miguel Jara, therapist- appointment on Wednesday 1/11/17 @ 4 pm.  Nori Fuller, therapist - appointment on Friday 1/13/17 @ 2:45 pm.  [facility]  [address]  [place] [address]  [phone] fax: [phone]    Resources:   Crisis Intervention: 824.171.6287 or 933-824-2437 (TTY: 864.137.7448).  Call anytime for help.  National [place] on Mental Illness (www.[place].jian.org): 275.515.8060 or 062-559-6604.  Alcoholics Anonymous (www.alcoholics-anonymous.org): Check your phone book for your local chapter.  National Suicide Prevention Line (www.mentalhealthmn.org): 218-096-AFHU (5321)    General Medication Instructions:   See your medication sheet(s) for instructions.   Take all medicines as directed.  Make no changes unless your doctor suggests them.   Go to all your doctor visits.  Be sure to have all your required lab tests. This way, your medicines can be refilled on time.  Do not use any drugs not prescribed by your doctor.  Avoid alcohol.

## 2017-01-09 NOTE — PLAN OF CARE
"Problem: Depressive Symptoms  Goal: Depressive Symptoms  Signs and symptoms of listed problems will be absent or manageable.   Outcome: Improving  During 1:1 Pt mentioned to staff her family will be able to attend family meeting tomorrow am. Pt would like to talk with MD prior to meeting to discuss her plan of care. Pt feels she is \"in a good place\" and feels ready for discharge. Pt wants to start MICD program asap to avoid any regression. Mood appears bright, attitude positive. No s/s or etoh withdrawal, not scoring on ciwa.        "
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
## 2017-01-09 NOTE — PROGRESS NOTES
Patient denies suicidal ideation.  Patient has a copy of discharge instructions and verbalizes understanding of them.  Discharging to home with family tonight.  Discharged to home with sister at 1700.

## 2017-01-11 NOTE — PLAN OF CARE
Problem: Discharge Planning  Goal: Discharge Planning (Adult, OB, Behavioral, Peds)  Attended process group on 1/9/17. Participation complete and appropriate.
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
## 2017-08-10 ENCOUNTER — HOSPITAL ENCOUNTER (INPATIENT)
Facility: CLINIC | Age: 57
LOS: 7 days | Discharge: HOME | End: 2017-08-18
Attending: EMERGENCY MEDICINE | Admitting: PSYCHIATRY & NEUROLOGY
Payer: COMMERCIAL

## 2017-08-10 ENCOUNTER — HOSPITAL ENCOUNTER (EMERGENCY)
Facility: CLINIC | Age: 57
End: 2017-08-10

## 2017-08-10 ENCOUNTER — TRANSFERRED RECORDS (OUTPATIENT)
Dept: HEALTH INFORMATION MANAGEMENT | Facility: CLINIC | Age: 57
End: 2017-08-10

## 2017-08-10 DIAGNOSIS — F32.A DEPRESSION WITH SUICIDAL IDEATION: Primary | ICD-10-CM

## 2017-08-10 DIAGNOSIS — F10.220 ACUTE ALCOHOLIC INTOXICATION IN ALCOHOLISM, UNCOMPLICATED (H): ICD-10-CM

## 2017-08-10 DIAGNOSIS — F33.9 EPISODE OF RECURRENT MAJOR DEPRESSIVE DISORDER, UNSPECIFIED DEPRESSION EPISODE SEVERITY (H): ICD-10-CM

## 2017-08-10 DIAGNOSIS — R11.0 NAUSEA: ICD-10-CM

## 2017-08-10 DIAGNOSIS — R45.851 DEPRESSION WITH SUICIDAL IDEATION: Primary | ICD-10-CM

## 2017-08-10 LAB — ALCOHOL BREATH TEST: 0.27 (ref 0–0.01)

## 2017-08-10 PROCEDURE — 90791 PSYCH DIAGNOSTIC EVALUATION: CPT

## 2017-08-10 PROCEDURE — 99285 EMERGENCY DEPT VISIT HI MDM: CPT

## 2017-08-10 NOTE — IP AVS SNAPSHOT
MRN:[id number]                      After Visit Summary   8/10/2017    Isabel Aviles    MRN: [id number]           Thank you!     Thank you for choosing [facility] for your care. Our goal is always to provide you with excellent care.        Patient Information     Date Of Birth          1960        Designated Caregiver       Most Recent Value    Caregiver    Will someone help with your care after discharge? yes    Name of designated caregiver Bernice    Phone number of caregiver [phone]    Caregiver address NA      About your hospital stay     You were admitted on:  August 11, 2017 You last received care in the:  [facility]    You were discharged on:  August 18, 2017       Who to Call     For medical emergencies, please call 911.  For non-urgent questions about your medical care, please call your primary care provider or clinic, [phone]          Attending Provider     Provider Specialty    Trierweiler, Chad A, MD Emergency Medicine    [facility] Medicine    [facility]iatry       Primary Care Provider Office Phone # Fax #    Dee Dee Miki Gallegos [phone]      Further instructions from your care team         Behavioral Discharge Planning and Instructions    Summary:  Admitted with worsening depression     Main Diagnosis:  Major Depression, recurrent, severe; PTSD; Alcohol Dependence; TBI by history    Major Treatments, Procedures and Findings:   Psychiatric assessment. Chemical dependency assessment. Medication adjustment.     Symptoms to Report: Losing more sleep, Mood getting worse or Thoughts of suicide    Lifestyle Adjustment:  Follow all treatment recommendations. Develop and follow safety plan.  Abstain from the use of all mood-altering substances, including alcohol, as usage may increase symptoms of depression. Maintain sobriety by attending daily AA or NA meetings and obtaining a sponsor.   Utilize positive coping skills to manage depressive symptoms.     Psychiatry Follow-up:     You have psychiatry follow up appointments scheduled with Estefania Malin RN, CNS at [facility] on Wednesday, August 30, 2017 at 2:00 pm and on Wednesday, September 6, 2017 at 1:20 pm.     Please go to [facility] on Monday morning to get your Vivitrol injection. The clinic is open from 9:00 am until 4:00 pm.         [facility]  [address], [address]  [place]  [address]  Phone:  [phone] / Fax:  [phone]    You are being referred to the [facility] Intensive Outpatient Program at [facility]. The  for [facility] is out of the office until Monday. A message was left to have her call you on Monday. If you do not hear anything from [facility] by Monday afternoon, please call and speak to Geeta. Her number is [phone]. You will likely start the IOP the day after your intake. The IOP meets Monday through Friday from 9am until 12noon.     [facility]  [address]  [place]  [address]  Phone:  [phone] / Fax:  [phone]    Resources:   Crisis Intervention: 795.105.2936 or 950-950-5830 (TTY: 706.623.7518).  Call anytime for help.  National [place] on Mental Illness (www.[place].jian.org): 976.316.5004 or 154-397-9203.  Alcoholics Anonymous (www.alcoholics-anonymous.org): Check your phone book for your local chapter.  National Suicide Prevention Line (www.mentalhealthmn.org): 065-369-HFUM (1968)  [facility] - Mental Health Crisis  8:00 AM - 4:30 PM call [phone] for crisis appointment. From 4:30 PM to 8:00 AM call [phone] for the mobile crisis service for emergency mental health services.    General Medication Instructions:   See your medication sheet(s) for instructions.   Take all medicines as directed.  Make no changes unless your doctor suggests them.   Go to all your doctor visits.  Be  "sure to have all your required lab tests. This way, your medicines can be refilled on time.  Do not use any drugs not prescribed by your doctor.  Avoid alcohol.      Pending Results     No orders found from 2017 to 2017.            Statement of Approval     Ordered          17 1323  I have reviewed and agree with all the recommendations and orders detailed in this document.  EFFECTIVE NOW     Approved and electronically signed by:  Ector Cotter MD             Admission Information     Date & Time Provider Department Dept. Phone    8/10/2017 Ector Cotter MD [facility] [phone]      Your Vitals Were     Blood Pressure Pulse Temperature Respirations Weight Pulse Oximetry    127/81 75 98.2  F (36.8  C) (Oral) 16 66.7 kg (147 lb) 98%    BMI (Body Mass Index)                   23.73 kg/m2           MyChart Information     [employer] lets you send messages to your doctor, view your test results, renew your prescriptions, schedule appointments and more. To sign up, go to [email or web address] . Click on \"Log in\" on the left side of the screen, which will take you to the Welcome page. Then click on \"Sign up Now\" on the right side of the page.     You will be asked to enter the access code listed below, as well as some personal information. Please follow the directions to create your username and password.     Your access code is: [email or web address]  Expires: 2017 10:51 PM     Your access code will  in 90 days. If you need help or a new code, please call your [facility] clinic or [phone].        Care EveryWhere ID     This is your Care EveryWhere ID. This could be used by other organizations to access your [facility] medical records  [id number]        Equal Access to Services     HANY STEARNS : Salomon Lema, michaelle amaral, angie randall, frank bucio. So [facility] [phone].    ATENCIÓN: Si habla " español, tiene a luis disposición servicios gratuitos de asistencia lingüística. Dimitri butterfield [phone].    We comply with applicable federal civil rights laws and [place] laws. We do not discriminate on the basis of race, color, national origin, age, disability sex, sexual orientation or gender identity.               Review of your medicines      START taking        Dose / Directions    hydrOXYzine 25 MG tablet   Commonly known as:  ATARAX        Dose:  25-50 mg   Take 1-2 tablets (25-50 mg) by mouth every 4 hours as needed for anxiety   Quantity:  120 tablet   Refills:  1         CONTINUE these medicines which may have CHANGED, or have new prescriptions. If we are uncertain of the size of tablets/capsules you have at home, strength may be listed as something that might have changed.        Dose / Directions    ARIPiprazole 5 MG tablet   Commonly known as:  ABILIFY   This may have changed:  how much to take   Used for:  Depression with suicidal ideation        Dose:  5 mg   Take 1 tablet (5 mg) by mouth daily   Quantity:  30 tablet   Refills:  0       * disulfiram 500 MG tablet   Commonly known as:  ANTABUSE   This may have changed:  Another medication with the same name was added. Make sure you understand how and when to take each.        Dose:  500 mg   Take 500 mg by mouth every morning   Refills:  0       * disulfiram 500 MG tablet   Commonly known as:  ANTABUSE   This may have changed:  You were already taking a medication with the same name, and this prescription was added. Make sure you understand how and when to take each.   Used for:  Acute alcoholic intoxication in alcoholism, uncomplicated (H)        Dose:  500 mg   Take 1 tablet (500 mg) by mouth every morning   Quantity:  30 tablet   Refills:  0       mirtazapine 30 MG tablet   Commonly known as:  REMERON   This may have changed:    - medication strength  - how much to take   Used for:  Depression with suicidal ideation        Dose:  30 mg   Take 1  tablet (30 mg) by mouth At Bedtime   Quantity:  30 tablet   Refills:  0       * QUETIAPINE FUMARATE PO   This may have changed:  Another medication with the same name was added. Make sure you understand how and when to take each.        Dose:  25 mg   Take 25 mg by mouth 4 times daily as needed Shauna reports filling 25mg BID   Refills:  0       * QUEtiapine 25 MG tablet   Commonly known as:  SEROquel   This may have changed:  You were already taking a medication with the same name, and this prescription was added. Make sure you understand how and when to take each.   Used for:  Episode of recurrent major depressive disorder, unspecified depression episode severity (H)        Dose:  25-50 mg   Take 1-2 tablets (25-50 mg) by mouth every 6 hours as needed (anxiety)   Quantity:  120 tablet   Refills:  0       vortioxetine 10 MG tablet   Commonly known as:  TRINTELLIX   This may have changed:    - medication strength  - how much to take   Used for:  Depression with suicidal ideation        Dose:  30 mg   Take 3 tablets (30 mg) by mouth every morning   Quantity:  90 tablet   Refills:  0       * ZOFRAN PO   This may have changed:  Another medication with the same name was added. Make sure you understand how and when to take each.        Dose:  4 mg   Take 4 mg by mouth every 8 hours as needed.   Refills:  0       * ondansetron 4 MG tablet   Commonly known as:  ZOFRAN   This may have changed:  You were already taking a medication with the same name, and this prescription was added. Make sure you understand how and when to take each.   Used for:  Episode of recurrent major depressive disorder, unspecified depression episode severity (H), Nausea        Dose:  4 mg   Take 1 tablet (4 mg) by mouth every 12 hours as needed for nausea or vomiting   Quantity:  60 tablet   Refills:  0       * Notice:  This list has 6 medication(s) that are the same as other medications prescribed for you. Read the directions carefully, and ask  your doctor or other care provider to review them with you.      CONTINUE these medicines which have NOT CHANGED        Dose / Directions    lisinopril 10 MG tablet   Commonly known as:  PRINIVIL/ZESTRIL        Dose:  10 mg   Take 10 mg by mouth daily.   Refills:  0       priLOSEC 20 MG CR capsule   Generic drug:  omeprazole        Dose:  20 mg   Take 20 mg by mouth daily.   Refills:  0       UNABLE TO FIND        MEDICATION NAME: Butalbital Patient reports Fioricet was discontinued and changed to straight Butalbital. Formulation not found.   Refills:  0       UNKNOWN TO PATIENT        Patient reports starting an antibiotic earlier this week for UTI via [facility].  Medication unknown and unable to access [facility] record.   Refills:  0            Where to get your medicines      These medications were sent to [facility]  [address] - [address]   [address] [address]     Phone:  [phone]     ARIPiprazole 5 MG tablet    disulfiram 500 MG tablet    hydrOXYzine 25 MG tablet    mirtazapine 30 MG tablet    ondansetron 4 MG tablet    QUEtiapine 25 MG tablet    vortioxetine 10 MG tablet                Protect others around you: Learn how to safely use, store and throw away your medicines at www.disposemymeds.org.             Medication List: This is a list of all your medications and when to take them. Check marks below indicate your daily home schedule. Keep this list as a reference.      Medications           Morning Afternoon Evening Bedtime As Needed    ARIPiprazole 5 MG tablet   Commonly known as:  ABILIFY   Take 1 tablet (5 mg) by mouth daily   Last time this was given:  5 mg on 8/18/2017  8:52 AM                                * disulfiram 500 MG tablet   Commonly known as:  ANTABUSE   Take 500 mg by mouth every morning   Last time this was given:  500 mg on 8/18/2017  8:52 AM                                * disulfiram 500 MG tablet   Commonly  known as:  ANTABUSE   Take 1 tablet (500 mg) by mouth every morning   Last time this was given:  500 mg on 8/18/2017  8:52 AM                                hydrOXYzine 25 MG tablet   Commonly known as:  ATARAX   Take 1-2 tablets (25-50 mg) by mouth every 4 hours as needed for anxiety   Last time this was given:  25 mg on 8/18/2017  9:02 AM                                lisinopril 10 MG tablet   Commonly known as:  PRINIVIL/ZESTRIL   Take 10 mg by mouth daily.   Last time this was given:  10 mg on 8/18/2017  8:52 AM                                mirtazapine 30 MG tablet   Commonly known as:  REMERON   Take 1 tablet (30 mg) by mouth At Bedtime   Last time this was given:  30 mg on 8/17/2017 10:12 PM                                priLOSEC 20 MG CR capsule   Take 20 mg by mouth daily.   Last time this was given:  20 mg on 8/18/2017  8:52 AM   Generic drug:  omeprazole                                * QUETIAPINE FUMARATE PO   Take 25 mg by mouth 4 times daily as needed Shauna reports filling 25mg BID   Last time this was given:  25 mg on 8/18/2017  9:02 AM                                * QUEtiapine 25 MG tablet   Commonly known as:  SEROquel   Take 1-2 tablets (25-50 mg) by mouth every 6 hours as needed (anxiety)   Last time this was given:  25 mg on 8/18/2017  9:02 AM                                UNABLE TO FIND   MEDICATION NAME: Butalbital Patient reports Fioricet was discontinued and changed to straight Butalbital. Formulation not found.                                UNKNOWN TO PATIENT   Patient reports starting an antibiotic earlier this week for UTI via [facility].  Medication unknown and [facility].                                vortioxetine 10 MG tablet   Commonly known as:  TRINTELLIX   Take 3 tablets (30 mg) by mouth every morning   Last time this was given:  30 mg on 8/18/2017  8:52 AM                                * ZOFRAN PO   Take 4 mg by mouth every 8 hours as needed.    Last time this was given:  4 mg on 8/18/2017  8:00 AM                                * ondansetron 4 MG tablet   Commonly known as:  ZOFRAN   Take 1 tablet (4 mg) by mouth every 12 hours as needed for nausea or vomiting   Last time this was given:  4 mg on 8/18/2017  8:00 AM                                * Notice:  This list has 6 medication(s) that are the same as other medications prescribed for you. Read the directions carefully, and ask your doctor or other care provider to review them with you.              More Information        Depression: Tips to Help Yourself  As your healthcare providers help treat your depression, you can also help yourself. Keep in mind that your illness affects you emotionally, physically, mentally, and socially. So full recovery will take time. Take care of your body and your soul, and be patient with yourself as you get better.    Self-care    Educate yourself. Read about treatment and medicine options. If you have the energy, attend local conferences or support groups. Keep a list of useful websites and helpful books and use them as needed. This illness is not your fault. Don t blame yourself for your depression.    Manage early symptoms. If you notice symptoms returning, experience triggers, or identify other factors that may lead to a depressive episode, get help as soon as possible. Ask trusted friends and family to monitor your behavior and let you know if they see anything of concern.    Work with your provider. Find a provider you can trust. Communicate honestly with that person and share information on your treatment for depression and your reaction to medicines.    Be prepared for a crisis. Know what to do if you experience a crisis. Keep the phone number of a crisis hotline and know the location of your community's urgent care centers and the closest emergency department.    Hold off on big decisions. Depression can cloud your judgment. So wait until you feel better  before making major life decisions, such as changing jobs, moving, or getting  or .    Be patient. Recovering from depression is a process. Don t be discouraged if it takes some time to feel better.    Keep it simple. Depression saps your energy and concentration. So you won t be able to do all the things you used to do. Set small goals and do what you can.    Be with others. Don t isolate yourself--you ll only feel worse. Try to be with other people. And take part in fun activities when you can. Go to a movie, [employer], Orthodoxy service, or social event. Talk openly with people you can trust. And accept help when it s offered.  Take care of your body  People with depression often lose the desire to take care of themselves. That only makes their problems worse. During treatment and afterward, make a point to:    Exercise. It s a great way to take care of your body. And studies have shown that exercise helps fight depression.    Avoid drugs and alcohol. These may ease the pain in the short term. But they ll only make your problems worse in the long run.    Get relief from stress. Ask your healthcare provider for relaxation exercises and techniques to help relieve stress.    Eat right. A balanced and healthy diet helps keep your body healthy.  Date Last Reviewed: 1/1/2017 2000-2017 [employer]. [address] [place] [address], [place] [address]. All rights reserved. This information is not intended as a substitute for professional medical care. Always follow your healthcare professional's instructions.                Understanding the Disease of Addiction  What is addiction?  Addiction is a chronic disease of the brain. It affects how your brain learns and functions.  Your genes and your environment can affect your risk for addiction. A family history of addiction also raises your risk, but anyone can have an addiction.  Unfortunately, many people falsely believe that addiction is a moral  weakness. They think that people addicted to drugs or alcohol are simply behaving badly or making poor choices.    How does addiction affect my brain?  Whether you start using drugs or alcohol is your choice. But once your brain is exposed to the addictive substance, your brain begins to change. This is especially true if you are vulnerable to addiction. These brain changes override self-control. This happens because the substance overexcites the reward center in the brain. The substance mimics the brain's own natural feel-good chemicals. The brain is rewired into believing that the substance is a good thing and that you need it to survive. So strong is this rewiring that over time, you no longer find pleasure in other things. The addiction trumps all other motivations.  If you continue to use the substance, your brain makes less and less of its own feel-good chemicals. You then must keep using drugs or alcohol to try to make up for the low levels of the brain chemicals. Your brain eventually needs more and more of the drug to achieve this. You need the drug without regard to the physical, emotional, and social costs.  Can you become addicted to things other than drugs or alcohol?  Addiction can develop in response to other pleasurable activities that stimulate the reward center of the brain. These activities include eating, having sex, gambling, using tobacco, and even using the internet.  Can you get control over a brain disease?  The only way to get over an addiction is to stop using the substance. By not using it, your brain can recover and return to its normal functioning. You can relearn how to find pleasure in other things. But, your brain will always be at risk for addiction. Because addiction is so powerful, you usually will need medical help and social support for long-term success.  Date Last Reviewed: 6/3/2015    8620-2694 [employer]. [address] [place] [address], [place] [address]. All rights  reserved. This information is not intended as a substitute for professional medical care. Always follow your healthcare professional's instructions.                Recovering from Addiction  Recovery means making a new life for yourself. This includes finding new interests. It involves building new relationships. It means taking better care of yourself. These will all help you replace substance use with a new and healthier life. They will also help you avoid the things that could make you want to use again.    Make lifestyle changes  A big part of recovery is changing habits. These are habits that may have led to your substance abuse. It s also a time for personal growth. Below are some changes you may want to make.    Find new activities and goals. You may want to try new hobbies and interests. Or, you may want to join an activity group to meet new people.    Build relationships. You may choose to spend more time with loved ones you lost touch with while you were using. You may also want to make new friends. And there may be some friends or family members you will not want to see because they are still using.    Exercise and eat well. Get some physical activity on most days. This can help you feel better. Eat healthy meals with lots of fruits, vegetables, and whole grains. This can also help your well-being. A nutritionist or fitness expert can help you.    Maintain ties with medical and addiction professionals. While you may not need intense professional support, it is important to have reliable safety nets so you can access professional assistance when needed.    Relax and get enough sleep. Good sleep can help you feel better. So can less stress. Ask your counselor about relaxation exercises. These may include meditation. Also ask about stress management classes.  Date Last Reviewed: 2/1/2017 2000-2017 [employer]. [address] [place] [address], [place] [address]. All rights reserved. This information  is not intended as a substitute for professional medical care. Always follow your healthcare professional's instructions.

## 2017-08-10 NOTE — IP AVS SNAPSHOT
[facility] [place] [address] [place] [address]    Phone:  [phone]                                       After Visit Summary   8/10/2017    Isabel Aviles    MRN: [id number]           After Visit Summary Signature Page     I have received my discharge instructions, and my questions have been answered. I have discussed any challenges I see with this plan with the nurse or doctor.    ..........................................................................................................................................  Patient/Patient Representative Signature      ..........................................................................................................................................  Patient Representative Print Name and Relationship to Patient    ..................................................               ................................................  Date                                            Time    ..........................................................................................................................................  Reviewed by Signature/Title    ...................................................              ..............................................  Date                                                            Time

## 2017-08-11 PROBLEM — F32.A DEPRESSION: Status: ACTIVE | Noted: 2017-08-11

## 2017-08-11 LAB
ALBUMIN SERPL-MCNC: 3.5 G/DL (ref 3.4–5)
ALBUMIN UR-MCNC: 10 MG/DL
ALP SERPL-CCNC: 62 U/L (ref 40–150)
ALT SERPL W P-5'-P-CCNC: 79 U/L (ref 0–50)
AMPHETAMINES UR QL SCN: ABNORMAL
ANION GAP SERPL CALCULATED.3IONS-SCNC: 7 MMOL/L (ref 3–14)
APPEARANCE UR: CLEAR
AST SERPL W P-5'-P-CCNC: 69 U/L (ref 0–45)
BACTERIA #/AREA URNS HPF: ABNORMAL /HPF
BARBITURATES UR QL: ABNORMAL
BENZODIAZ UR QL: ABNORMAL
BILIRUB SERPL-MCNC: 0.4 MG/DL (ref 0.2–1.3)
BILIRUB UR QL STRIP: NEGATIVE
BUN SERPL-MCNC: 15 MG/DL (ref 7–30)
CALCIUM SERPL-MCNC: 8.6 MG/DL (ref 8.5–10.1)
CANNABINOIDS UR QL SCN: ABNORMAL
CHLORIDE SERPL-SCNC: 102 MMOL/L (ref 94–109)
CO2 SERPL-SCNC: 25 MMOL/L (ref 20–32)
COCAINE UR QL: ABNORMAL
COLOR UR AUTO: YELLOW
CREAT SERPL-MCNC: 0.64 MG/DL (ref 0.52–1.04)
ERYTHROCYTE [DISTWIDTH] IN BLOOD BY AUTOMATED COUNT: 15 % (ref 10–15)
GFR SERPL CREATININE-BSD FRML MDRD: ABNORMAL ML/MIN/1.7M2
GLUCOSE SERPL-MCNC: 184 MG/DL (ref 70–99)
GLUCOSE UR STRIP-MCNC: NEGATIVE MG/DL
HCT VFR BLD AUTO: 36.6 % (ref 35–47)
HGB BLD-MCNC: 12.2 G/DL (ref 11.7–15.7)
HGB UR QL STRIP: NEGATIVE
KETONES UR STRIP-MCNC: NEGATIVE MG/DL
LEUKOCYTE ESTERASE UR QL STRIP: ABNORMAL
MCH RBC QN AUTO: 33.1 PG (ref 26.5–33)
MCHC RBC AUTO-ENTMCNC: 33.3 G/DL (ref 31.5–36.5)
MCV RBC AUTO: 99 FL (ref 78–100)
MUCOUS THREADS #/AREA URNS LPF: PRESENT /LPF
NITRATE UR QL: POSITIVE
OPIATES UR QL SCN: ABNORMAL
PCP UR QL SCN: ABNORMAL
PH UR STRIP: 8 PH (ref 5–7)
PLATELET # BLD AUTO: 143 10E9/L (ref 150–450)
POTASSIUM SERPL-SCNC: 3.9 MMOL/L (ref 3.4–5.3)
PROT SERPL-MCNC: 6.9 G/DL (ref 6.8–8.8)
RBC # BLD AUTO: 3.69 10E12/L (ref 3.8–5.2)
RBC #/AREA URNS AUTO: 2 /HPF (ref 0–2)
RENAL EPI CELLS #/AREA URNS HPF: 1 /HPF
SODIUM SERPL-SCNC: 134 MMOL/L (ref 133–144)
SP GR UR STRIP: 1.01 (ref 1–1.03)
SQUAMOUS #/AREA URNS AUTO: 1 /HPF (ref 0–1)
T4 FREE SERPL-MCNC: 0.79 NG/DL (ref 0.76–1.46)
TSH SERPL DL<=0.005 MIU/L-ACNC: 0.26 MU/L (ref 0.4–4)
URN SPEC COLLECT METH UR: ABNORMAL
UROBILINOGEN UR STRIP-MCNC: NORMAL MG/DL (ref 0–2)
WBC # BLD AUTO: 5 10E9/L (ref 4–11)
WBC #/AREA URNS AUTO: 48 /HPF (ref 0–2)

## 2017-08-11 PROCEDURE — 36415 COLL VENOUS BLD VENIPUNCTURE: CPT | Performed by: PSYCHIATRY & NEUROLOGY

## 2017-08-11 PROCEDURE — 80053 COMPREHEN METABOLIC PANEL: CPT | Performed by: PSYCHIATRY & NEUROLOGY

## 2017-08-11 PROCEDURE — 25000132 ZZH RX MED GY IP 250 OP 250 PS 637: Performed by: PSYCHIATRY & NEUROLOGY

## 2017-08-11 PROCEDURE — 80307 DRUG TEST PRSMV CHEM ANLYZR: CPT | Performed by: EMERGENCY MEDICINE

## 2017-08-11 PROCEDURE — 12400006 ZZH R&B MH INTERMEDIATE

## 2017-08-11 PROCEDURE — 90791 PSYCH DIAGNOSTIC EVALUATION: CPT

## 2017-08-11 PROCEDURE — 84443 ASSAY THYROID STIM HORMONE: CPT | Performed by: PSYCHIATRY & NEUROLOGY

## 2017-08-11 PROCEDURE — HZ2ZZZZ DETOXIFICATION SERVICES FOR SUBSTANCE ABUSE TREATMENT: ICD-10-PCS | Performed by: PSYCHIATRY & NEUROLOGY

## 2017-08-11 PROCEDURE — 84439 ASSAY OF FREE THYROXINE: CPT | Performed by: PSYCHIATRY & NEUROLOGY

## 2017-08-11 PROCEDURE — 81001 URINALYSIS AUTO W/SCOPE: CPT | Performed by: EMERGENCY MEDICINE

## 2017-08-11 PROCEDURE — 25000132 ZZH RX MED GY IP 250 OP 250 PS 637: Performed by: PHYSICIAN ASSISTANT

## 2017-08-11 PROCEDURE — 85027 COMPLETE CBC AUTOMATED: CPT | Performed by: PSYCHIATRY & NEUROLOGY

## 2017-08-11 RX ORDER — SULFAMETHOXAZOLE/TRIMETHOPRIM 800-160 MG
1 TABLET ORAL 2 TIMES DAILY
Status: COMPLETED | OUTPATIENT
Start: 2017-08-11 | End: 2017-08-16

## 2017-08-11 RX ORDER — LISINOPRIL 10 MG/1
10 TABLET ORAL DAILY
Status: DISCONTINUED | OUTPATIENT
Start: 2017-08-11 | End: 2017-08-18 | Stop reason: HOSPADM

## 2017-08-11 RX ORDER — MIRTAZAPINE 7.5 MG/1
7.5 TABLET, FILM COATED ORAL AT BEDTIME
Status: DISCONTINUED | OUTPATIENT
Start: 2017-08-11 | End: 2017-08-15

## 2017-08-11 RX ORDER — DISULFIRAM 250 MG/1
500 TABLET ORAL EVERY MORNING
Status: DISCONTINUED | OUTPATIENT
Start: 2017-08-12 | End: 2017-08-18 | Stop reason: HOSPADM

## 2017-08-11 RX ORDER — MULTIPLE VITAMINS W/ MINERALS TAB 9MG-400MCG
1 TAB ORAL DAILY
Status: DISCONTINUED | OUTPATIENT
Start: 2017-08-11 | End: 2017-08-18 | Stop reason: HOSPADM

## 2017-08-11 RX ORDER — LANOLIN ALCOHOL/MO/W.PET/CERES
100 CREAM (GRAM) TOPICAL DAILY
Status: COMPLETED | OUTPATIENT
Start: 2017-08-11 | End: 2017-08-15

## 2017-08-11 RX ORDER — ARIPIPRAZOLE 2 MG/1
2 TABLET ORAL DAILY
Status: DISCONTINUED | OUTPATIENT
Start: 2017-08-11 | End: 2017-08-14

## 2017-08-11 RX ORDER — HYDROXYZINE HYDROCHLORIDE 25 MG/1
25-50 TABLET, FILM COATED ORAL EVERY 4 HOURS PRN
Status: DISCONTINUED | OUTPATIENT
Start: 2017-08-11 | End: 2017-08-18 | Stop reason: HOSPADM

## 2017-08-11 RX ORDER — ONDANSETRON 4 MG/1
4 TABLET, FILM COATED ORAL EVERY 8 HOURS PRN
Status: DISCONTINUED | OUTPATIENT
Start: 2017-08-11 | End: 2017-08-18 | Stop reason: HOSPADM

## 2017-08-11 RX ORDER — PHENAZOPYRIDINE HYDROCHLORIDE 100 MG/1
100 TABLET, FILM COATED ORAL 3 TIMES DAILY PRN
Status: DISPENSED | OUTPATIENT
Start: 2017-08-11 | End: 2017-08-13

## 2017-08-11 RX ORDER — LORAZEPAM 2 MG/ML
1-2 INJECTION INTRAMUSCULAR EVERY 30 MIN PRN
Status: DISCONTINUED | OUTPATIENT
Start: 2017-08-11 | End: 2017-08-11

## 2017-08-11 RX ORDER — LORAZEPAM 1 MG/1
1-2 TABLET ORAL EVERY 30 MIN PRN
Status: DISCONTINUED | OUTPATIENT
Start: 2017-08-11 | End: 2017-08-15

## 2017-08-11 RX ORDER — DISULFIRAM 500 MG/1
500 TABLET ORAL EVERY MORNING
COMMUNITY

## 2017-08-11 RX ORDER — ACETAMINOPHEN 325 MG/1
650 TABLET ORAL EVERY 4 HOURS PRN
Status: DISCONTINUED | OUTPATIENT
Start: 2017-08-11 | End: 2017-08-18 | Stop reason: HOSPADM

## 2017-08-11 RX ORDER — QUETIAPINE FUMARATE 25 MG/1
25 TABLET, FILM COATED ORAL EVERY 6 HOURS PRN
Status: DISCONTINUED | OUTPATIENT
Start: 2017-08-11 | End: 2017-08-18 | Stop reason: HOSPADM

## 2017-08-11 RX ORDER — FOLIC ACID 1 MG/1
1 TABLET ORAL DAILY
Status: DISCONTINUED | OUTPATIENT
Start: 2017-08-11 | End: 2017-08-18 | Stop reason: HOSPADM

## 2017-08-11 RX ADMIN — FOLIC ACID 1 MG: 1 TABLET ORAL at 16:29

## 2017-08-11 RX ADMIN — OMEPRAZOLE 20 MG: 20 CAPSULE, DELAYED RELEASE ORAL at 17:03

## 2017-08-11 RX ADMIN — SULFAMETHOXAZOLE AND TRIMETHOPRIM 1 TABLET: 800; 160 TABLET ORAL at 19:42

## 2017-08-11 RX ADMIN — MULTIPLE VITAMINS W/ MINERALS TAB 1 TABLET: TAB at 16:29

## 2017-08-11 RX ADMIN — ARIPIPRAZOLE 2 MG: 2 TABLET ORAL at 17:03

## 2017-08-11 RX ADMIN — QUETIAPINE FUMARATE 25 MG: 25 TABLET, FILM COATED ORAL at 22:37

## 2017-08-11 RX ADMIN — PHENAZOPYRIDINE HYDROCHLORIDE 100 MG: 100 TABLET ORAL at 21:59

## 2017-08-11 RX ADMIN — MIRTAZAPINE 7.5 MG: 7.5 TABLET, FILM COATED ORAL at 20:25

## 2017-08-11 RX ADMIN — LISINOPRIL 10 MG: 10 TABLET ORAL at 17:03

## 2017-08-11 RX ADMIN — Medication 100 MG: at 16:29

## 2017-08-11 ASSESSMENT — ACTIVITIES OF DAILY LIVING (ADL)
ORAL_HYGIENE: INDEPENDENT
DRESS: SCRUBS (BEHAVIORAL HEALTH)
LAUNDRY: WITH SUPERVISION
GROOMING: INDEPENDENT

## 2017-08-11 NOTE — PHARMACY-ADMISSION MEDICATION HISTORY
Admission medication history interview status for the 8/10/2017  admission is complete. See EPIC admission navigator for prior to admission medications     Medication history source reliability:Moderate    Actions taken by pharmacist (provider contacted, etc): Spoke with patient. Called Shauna Wynn for recent fill history.    Additional medication history information not noted on PTA med list :  - Medication unknown and access to [facility] records declined.    Medication reconciliation/reorder completed by provider prior to medication history? No    Time spent in this activity: 20 minutes    Prior to Admission medications    Medication Sig Last Dose Taking? Auth Provider   MIRTAZAPINE PO Take 7.5 mg by mouth At Bedtime  8/10/2017 at pm Yes Unknown, Entered By History   Vortioxetine HBr (TRINTELLIX PO) Take 20 mg by mouth every morning 8/10/2017 at am Yes Unknown, Entered By History   QUETIAPINE FUMARATE PO Take 25 mg by mouth 4 times daily as needed Shauna reports filling 25mg BID 8/10/2017 at am Yes Unknown, Entered By History   UNABLE TO FIND MEDICATION NAME: Butalbital  Patient reports Fioricet was discontinued and changed to straight Butalbital. Formulation not found. Past Week at Unknown time Yes Unknown, Entered By History   UNKNOWN TO PATIENT Patient reports starting an antibiotic earlier this week for UTI via [facility].   Medication unknown and unable to [facility] record. Past Week at Unknown time Yes Unknown, Entered By History   Ondansetron HCl (ZOFRAN PO) Take 4 mg by mouth every 8 hours as needed. 8/5/2017 at unknown Yes Reported, Patient   omeprazole (PRILOSEC) 20 MG capsule Take 20 mg by mouth daily. 8/10/2017 at am Yes Reported, Patient   lisinopril (PRINIVIL,ZESTRIL) 10 MG tablet Take 10 mg by mouth daily. 8/10/2017 at am Yes Reported, Patient   ARIPIPRAZOLE PO Take 2 mg by mouth daily  Not yet started  Unknown, Entered By History   disulfiram (ANTABUSE) 500 MG tablet Take 500 mg by  mouth every morning Unknown  Not yet resumed  Unknown, Entered By History
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
## 2017-08-11 NOTE — ED NOTES
Assumed care at 7 am. Pt interviewed by DEC later in am and recommend admission. No sx of withdrawal or vitals to suggest that. Awaiting bed. Notes dysuria-on antibiotics for UTI dxed on phone-does not recall where filled or what antibiotic. Will check UA.      Emmie Stroud MD  08/11/17 2279

## 2017-08-11 NOTE — ED NOTES
Pt. Saw counselor this morning and was referred by that counselor to be assessed if inpatient level of care is needed.  The pt. Tells me she wasn't able to come to the hospital until late tonight because she didn't have a ride.  She does admit to drinking alcohol throughout the day.

## 2017-08-11 NOTE — PLAN OF CARE
"Problem: Depressive Symptoms  Goal: Depressive Symptoms  Signs and symptoms of listed problems will be absent or manageable.   Outcome: No Change  Patient comes from [facility] ED. She is voluntary. She had come to the hospital upon the direction of her counselor and the assistance of her sister. Letter from her counselor can be found in the chart. Patient was intoxicated at the time with a WILLY of .267. She was paranoid at the time. Patient spent the night in the ED. She reports that she was in a IOP, CDI, MHI treatment program from January through Mid June. One day after the program was completed, she began drinking heavily. She reports at least 6 drinks of wine every day for at least 5 weeks. She reports being hopeless, depressed, unable to care for ADLS, and has missed 9 days of work. She attributes this to her divorce in May. \"I have to start over now.\" Patient is cooperative and wants help. Reports UTI symptoms, hospitalist has ordered Bactrim.       "

## 2017-08-11 NOTE — PROGRESS NOTES
08/11/17 1513   Patient Belongings   Did you bring any home meds/supplements to the hospital?  No   Patient Belongings other (see comments)   Disposition of Belongings in locker and safe   Belongings Search Yes   Clothing Search Yes   Second Staff Orin Armenta  Purse  Glasses case  Cell phone  Oral pain medication  Keychain w 3 keys, 2 fabs, 2 mini store cards  Misc. Store rewards cards  Misc. Business cards  [place] DL x2  Loose change in coin purse  Pound purse  Loose makeup  Comb  Bag  Brown sandals  Black pants w string  Grey sweatshirt  Jeans  Long sleeve Shirt  Bra  Tank top  Eye drops  Contact case w solution  Scissors  Tape measure    Security Envelope [phone]  [place]ales 5784  [employer] 3419  Visa 0455, 5914, 0390, 4710, 3229   3512, 6820, 6820, 9472, 7384  VS 0592  [facility] 7808  Care Credit [id number]  SSC x2  Copies of SSC x2  [employer] GC  [employer] GC  [employer] GC    Admission:  I am responsible for any personal items that are not sent to the safe or pharmacy.  [facility] is not responsible for loss, theft or damage of any property in my possession.    Signature:  _________________________________ Date: _______  Time: _____                                              Staff Signature:  ____________________________ Date: ________  Time: _____      2nd Staff person, if patient is unable/unwilling to sign:    Signature: ________________________________ Date: ________  Time: _____     Discharge:  [facility] has returned all of my personal belongings:    Signature: _________________________________ Date: ________  Time: _____                                          Staff Signature:  ____________________________ Date: ________  Time: _____

## 2017-08-11 NOTE — ED NOTES
UA positive. Pt already transferred. Asked RN to alert RN at [facility] that she should be treated.      Emmie Stroud MD  08/11/17 7319

## 2017-08-11 NOTE — PROGRESS NOTES
Nursing assessment complete including patient and medication profiles. Risk assessments completed addressing suicide,fall,skin,nutrition and safety issues. Care plan initiated. Assessments reviewed with physician and admit orders received. Welcome packet reviewed with patient. Information reviewed includes getting emergency help, preventing infections, understanding your care, using medication safely, reducing falls, preventing pressure ulcers, smoking cessation, powerful choices and Patients Bill of Rights. Pt. given tour of the unit and instruction on use of facility including emergency call light. Program schedule reviewed with patient. Questions regarding the unit addressed. Pt. Search completed and belongings inventoried.

## 2017-08-11 NOTE — ED NOTES
Pt request UTI medication. Pt states she is currently taking a UTI med, doesn't know which one. C/o urinary sx. MD notified. Pt now resting on cart. Breathing easy, non-labored. Denies SI/HI

## 2017-08-11 NOTE — ED NOTES
Bed: [facility]  Expected date:   Expected time:   Means of arrival:   Comments:  Triage SI

## 2017-08-12 PROCEDURE — 90853 GROUP PSYCHOTHERAPY: CPT

## 2017-08-12 PROCEDURE — 25000132 ZZH RX MED GY IP 250 OP 250 PS 637: Performed by: PHYSICIAN ASSISTANT

## 2017-08-12 PROCEDURE — 99222 1ST HOSP IP/OBS MODERATE 55: CPT | Performed by: INTERNAL MEDICINE

## 2017-08-12 PROCEDURE — 25000132 ZZH RX MED GY IP 250 OP 250 PS 637: Performed by: PSYCHIATRY & NEUROLOGY

## 2017-08-12 PROCEDURE — 12400006 ZZH R&B MH INTERMEDIATE

## 2017-08-12 RX ORDER — NALTREXONE HYDROCHLORIDE 50 MG/1
50 TABLET, FILM COATED ORAL DAILY
Status: DISCONTINUED | OUTPATIENT
Start: 2017-08-12 | End: 2017-08-18 | Stop reason: HOSPADM

## 2017-08-12 RX ADMIN — QUETIAPINE FUMARATE 25 MG: 25 TABLET, FILM COATED ORAL at 10:03

## 2017-08-12 RX ADMIN — Medication 100 MG: at 09:32

## 2017-08-12 RX ADMIN — SULFAMETHOXAZOLE AND TRIMETHOPRIM 1 TABLET: 800; 160 TABLET ORAL at 09:31

## 2017-08-12 RX ADMIN — OMEPRAZOLE 20 MG: 20 CAPSULE, DELAYED RELEASE ORAL at 09:36

## 2017-08-12 RX ADMIN — FOLIC ACID 1 MG: 1 TABLET ORAL at 09:32

## 2017-08-12 RX ADMIN — MIRTAZAPINE 7.5 MG: 7.5 TABLET, FILM COATED ORAL at 20:53

## 2017-08-12 RX ADMIN — LISINOPRIL 10 MG: 10 TABLET ORAL at 09:32

## 2017-08-12 RX ADMIN — VORTIOXETINE 20 MG: 20 TABLET, FILM COATED ORAL at 09:31

## 2017-08-12 RX ADMIN — NALTREXONE HYDROCHLORIDE 50 MG: 50 TABLET, FILM COATED ORAL at 09:36

## 2017-08-12 RX ADMIN — MULTIPLE VITAMINS W/ MINERALS TAB 1 TABLET: TAB at 09:36

## 2017-08-12 RX ADMIN — ARIPIPRAZOLE 2 MG: 2 TABLET ORAL at 09:32

## 2017-08-12 RX ADMIN — DISULFIRAM 500 MG: 250 TABLET ORAL at 09:32

## 2017-08-12 RX ADMIN — LORAZEPAM 1 MG: 1 TABLET ORAL at 15:51

## 2017-08-12 RX ADMIN — LORAZEPAM 1 MG: 1 TABLET ORAL at 12:37

## 2017-08-12 RX ADMIN — SULFAMETHOXAZOLE AND TRIMETHOPRIM 1 TABLET: 800; 160 TABLET ORAL at 20:53

## 2017-08-12 RX ADMIN — LORAZEPAM 1 MG: 1 TABLET ORAL at 20:53
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
## 2017-08-12 NOTE — H&P
See dictation      57 year old admitted with depression and etoh abuse.  Also with HTN and currently getting bactrim for an uti.      Stable.  Will sign off, please call with any questions or concerns.    EVANS Hernandez MD

## 2017-08-12 NOTE — PROGRESS NOTES
SPIRITUAL HEALTH SERVICES Progress Note  [facility] 77    Initial visit.  SH visited w pt earlier this year.  Pt reported on her life since then and notes that since her divorce she's felt increased loneliness and vulnerability (a decreased sense of safety).   Pt said her therapist thinks she may be experiencing some situational grief from her divorce, and SH also thinks that is likely.  Pt endorses interest in ongoing participation in CD treatment programs, and SH affirms that participation as well as suggesting participation in some form of grief support group.  Pt additionally spoke of very strong family support and the resource of her rickey.  SH and pt talked in detail about how both of these resources might/can support her recovery and healing.  Pt believes that this hospitalization is a good first step toward her eventual goals of sobriety and vitality.  SH again affirmed that perspective, and provided emotional/spiritual support,  and prayer.                                                                                                                                           Gilmer Guillermo M.Div., [facility]  Staff   Pager [phone]

## 2017-08-12 NOTE — PLAN OF CARE
Problem: Depressive Symptoms  Goal: Depressive Symptoms  Signs and symptoms of listed problems will be absent or manageable.   Outcome: No Change  Pt was present on the unit and attended group in the morning but around 12:00 Pt began experiencing nausea, vomiting, increasing sweating, and anxiety. Pt scored a 9 on CIWA. Pt was given Attivan 1(mg). Pt reported improved symptoms. Pt was pleasant and cooperative.

## 2017-08-13 PROCEDURE — 25000132 ZZH RX MED GY IP 250 OP 250 PS 637: Performed by: PSYCHIATRY & NEUROLOGY

## 2017-08-13 PROCEDURE — 25000125 ZZHC RX 250: Performed by: PSYCHIATRY & NEUROLOGY

## 2017-08-13 PROCEDURE — 25000132 ZZH RX MED GY IP 250 OP 250 PS 637: Performed by: PHYSICIAN ASSISTANT

## 2017-08-13 PROCEDURE — 12400006 ZZH R&B MH INTERMEDIATE

## 2017-08-13 RX ADMIN — ARIPIPRAZOLE 2 MG: 2 TABLET ORAL at 08:31

## 2017-08-13 RX ADMIN — VORTIOXETINE 20 MG: 20 TABLET, FILM COATED ORAL at 08:31

## 2017-08-13 RX ADMIN — SULFAMETHOXAZOLE AND TRIMETHOPRIM 1 TABLET: 800; 160 TABLET ORAL at 21:54

## 2017-08-13 RX ADMIN — LORAZEPAM 1 MG: 1 TABLET ORAL at 04:31

## 2017-08-13 RX ADMIN — QUETIAPINE FUMARATE 25 MG: 25 TABLET, FILM COATED ORAL at 22:00

## 2017-08-13 RX ADMIN — Medication 100 MG: at 08:31

## 2017-08-13 RX ADMIN — SULFAMETHOXAZOLE AND TRIMETHOPRIM 1 TABLET: 800; 160 TABLET ORAL at 08:31

## 2017-08-13 RX ADMIN — LORAZEPAM 1 MG: 1 TABLET ORAL at 08:47

## 2017-08-13 RX ADMIN — MULTIPLE VITAMINS W/ MINERALS TAB 1 TABLET: TAB at 08:31

## 2017-08-13 RX ADMIN — MIRTAZAPINE 7.5 MG: 7.5 TABLET, FILM COATED ORAL at 21:54

## 2017-08-13 RX ADMIN — OMEPRAZOLE 20 MG: 20 CAPSULE, DELAYED RELEASE ORAL at 08:31

## 2017-08-13 RX ADMIN — DISULFIRAM 500 MG: 250 TABLET ORAL at 08:31

## 2017-08-13 RX ADMIN — LISINOPRIL 10 MG: 10 TABLET ORAL at 08:31

## 2017-08-13 RX ADMIN — ONDANSETRON 4 MG: 4 TABLET, FILM COATED ORAL at 11:27

## 2017-08-13 RX ADMIN — LORAZEPAM 2 MG: 1 TABLET ORAL at 11:27

## 2017-08-13 RX ADMIN — FOLIC ACID 1 MG: 1 TABLET ORAL at 08:31

## 2017-08-13 RX ADMIN — NALTREXONE HYDROCHLORIDE 50 MG: 50 TABLET, FILM COATED ORAL at 08:31

## 2017-08-13 NOTE — PLAN OF CARE
Problem: Depressive Symptoms  Goal: Depressive Symptoms  Signs and symptoms of listed problems will be absent or manageable.   Pt. demonstrating signs and symptoms of alcohol withdrawal tonight. Scored a 9 and a 8 on CIWA requiring Ativan 1mg x2. Having diaphoresis, tremor, nausea and anxiety. Symptoms relieved temporarily with dose of ativan. Did eat dinner after declining lunch. Fluids being pushed. Declining groups at this time.

## 2017-08-13 NOTE — PLAN OF CARE
Problem: Depressive Symptoms  Goal: Depressive Symptoms  Signs and symptoms of listed problems will be absent or manageable.   Outcome: No Change  Pt scored a 9,15, and 1 on CIWA. Pt around lunch vomited and was giving addivan 1(mg) after the 9 and addivan 2(mg) after the 15. Pt reported sweating during the night. Pt stated that her anxiety improved after lunch. Pt feels shame for relapsing on alcohol and was crying about how she her family is distancing themselves from her. Pt stated that she stressed about her family situation and loneliness. Pt states that boredom and living alone causes her to drink.

## 2017-08-13 NOTE — PROGRESS NOTES
SPIRITUAL HEALTH SERVICES Progress Note  [facility]    Visit with pt following spirituality group.  Pt cried more than once during spirituality group, although she said very little.  After group, upon being asked how she is doing, pt indicated that she isn't doing well.  She complained of loneliness and the inability to get herself to do things.  She said that she has no one in her life since her divorce finalized.  Her dog, who had been living in her apartment with her,  in the spring, and she feels that her children are tired of the drama around her difficulties with mental health.  Pt expresses sadness that her children have not reached out to her during this hospitalization.  Pt says that it is her ex- who has called.  She reports that he is interested in counseling to try to improve their relationship.  Pt is interested in adopting a cat after discharge so that her apartment does not feel so lonely.   provided emotional support.    Plan:  team remains available upon request.                                                                                                                                           Katerina Flores M.Div.  Chaplain Resident  Pager [phone]

## 2017-08-14 PROCEDURE — 25000132 ZZH RX MED GY IP 250 OP 250 PS 637: Performed by: PHYSICIAN ASSISTANT

## 2017-08-14 PROCEDURE — 25000132 ZZH RX MED GY IP 250 OP 250 PS 637: Performed by: PSYCHIATRY & NEUROLOGY

## 2017-08-14 PROCEDURE — 25000125 ZZHC RX 250: Performed by: PSYCHIATRY & NEUROLOGY

## 2017-08-14 PROCEDURE — 97150 GROUP THERAPEUTIC PROCEDURES: CPT | Mod: GO

## 2017-08-14 PROCEDURE — H0001 ALCOHOL AND/OR DRUG ASSESS: HCPCS

## 2017-08-14 PROCEDURE — 12400000 ZZH R&B MH

## 2017-08-14 RX ORDER — ARIPIPRAZOLE 5 MG/1
5 TABLET ORAL DAILY
Status: DISCONTINUED | OUTPATIENT
Start: 2017-08-15 | End: 2017-08-18 | Stop reason: HOSPADM

## 2017-08-14 RX ORDER — ARIPIPRAZOLE 2 MG/1
2 TABLET ORAL ONCE
Status: COMPLETED | OUTPATIENT
Start: 2017-08-14 | End: 2017-08-14

## 2017-08-14 RX ADMIN — LORAZEPAM 1 MG: 1 TABLET ORAL at 03:20

## 2017-08-14 RX ADMIN — Medication 100 MG: at 08:06

## 2017-08-14 RX ADMIN — QUETIAPINE FUMARATE 25 MG: 25 TABLET, FILM COATED ORAL at 22:22

## 2017-08-14 RX ADMIN — QUETIAPINE FUMARATE 25 MG: 25 TABLET, FILM COATED ORAL at 13:01

## 2017-08-14 RX ADMIN — ARIPIPRAZOLE 2 MG: 2 TABLET ORAL at 12:30

## 2017-08-14 RX ADMIN — FOLIC ACID 1 MG: 1 TABLET ORAL at 08:06

## 2017-08-14 RX ADMIN — ARIPIPRAZOLE 2 MG: 2 TABLET ORAL at 08:06

## 2017-08-14 RX ADMIN — MIRTAZAPINE 7.5 MG: 7.5 TABLET, FILM COATED ORAL at 22:20

## 2017-08-14 RX ADMIN — DISULFIRAM 500 MG: 250 TABLET ORAL at 08:06

## 2017-08-14 RX ADMIN — MULTIPLE VITAMINS W/ MINERALS TAB 1 TABLET: TAB at 08:06

## 2017-08-14 RX ADMIN — NALTREXONE HYDROCHLORIDE 50 MG: 50 TABLET, FILM COATED ORAL at 08:06

## 2017-08-14 RX ADMIN — OMEPRAZOLE 20 MG: 20 CAPSULE, DELAYED RELEASE ORAL at 08:06

## 2017-08-14 RX ADMIN — SULFAMETHOXAZOLE AND TRIMETHOPRIM 1 TABLET: 800; 160 TABLET ORAL at 08:06

## 2017-08-14 RX ADMIN — VORTIOXETINE 20 MG: 20 TABLET, FILM COATED ORAL at 08:06

## 2017-08-14 RX ADMIN — SULFAMETHOXAZOLE AND TRIMETHOPRIM 1 TABLET: 800; 160 TABLET ORAL at 22:20

## 2017-08-14 RX ADMIN — ONDANSETRON 4 MG: 4 TABLET, FILM COATED ORAL at 08:53

## 2017-08-14 RX ADMIN — LISINOPRIL 10 MG: 10 TABLET ORAL at 08:06
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
## 2017-08-14 NOTE — PLAN OF CARE
Problem: General Rehab Plan of Care  Goal: Occupational Therapy Goals  The patient and/or their representative will achieve their patient-specific goals related to the plan of care.  The patient-specific goals include:  INITIAL O.T. ASSESSMENT   Details:  Pt participated in OT Exercise group today. Affect was appropriate to situation. Behavior was organized. Pt followed directions 100% of the time. Attention was good during 30 minute structured activity. Speech was articulate and clear.

## 2017-08-14 NOTE — CONSULTS
The writer met with the patient, introduced herself and her role. The patient completed an assessment and signed a MANAS for [facility] IOP treatment program.Pt is agreeable to attend IOP treatment there.  The writer will finish up the assessment and send the referral to the program. The writer provided the pt with her business card along with the programs telephone number on the back of the card for her to follow up with the program if she would like.

## 2017-08-14 NOTE — PLAN OF CARE
Problem: Depressive Symptoms  Goal: Depressive Symptoms  Signs and symptoms of listed problems will be absent or manageable.   Pt. Continues on the CIWA protocol. Symptoms of withdrawal resolving. Spent most of the evening on the SDU-working on a puzzle and interacting with staff/peers. Verbalized shame about alcohol relapse and stressed about strain this has caused with family members

## 2017-08-14 NOTE — PROGRESS NOTES
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 enough questions to determine whether the person has use related problems or assets that can be built upon in the following areas: Family/friends/relationships; Legal; Financial; Emotional; Educational; Recreational/ leisure; Vocational/employment; Living arrangements (DSM)      Per clt: Family/friend relationships-family, some good, some are supportive, a couple are very disappointed in me, where my son has distance hisself from me and that is touch, work is very supportive,ex- is supportive, we talk now that divorce is finalized, he has called me three times since being here, and daughter has as well, most part, have support I need, I look forward to the support I am going to get, voluntarily came, very happy with divorce, and should love life, live independently, have fabulous apartment. Good place to live, need counseling and help and supports group and take on the next phase. Legal- none. 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     3. What activities have you engaged in when  using alcohol/other drugs that could be hazardous to you or others (i.e. driving a car/motorcycle/boat, operating machinery, unsafe sex, sharing needles for drugs or tattoos, etc     Per clt: none. Drink at home.     4. How much time do you spend getting, using or getting over using alcohol or drugs? (DSM)     Per clt: buy it, start drinking it right away, and really not stop until it was gone, or case of box, sit and watch tv, functioning, then kick back and fall asleep. Get up, instead of going to fridge to get water, get class of wine. Since June, 2017, one and it was recent, last coupe of weekends, sick to stomach, due to drinking, getting over in general, Monday morning would feel shaky and night sweats both of those would be attributed to alcohol. Combination between that and hot flashes since on medication for that as well.     5. Reasons for drinking/drug use (Use the space below to record answers. It may not be necessary to ask each item.)  Like the feeling Yes   Trying to forget problems Yes   To cope with stress Yes   To relieve physical pain No   To cope with anxiety Yes   To cope with depression Yes   To relax or unwind Yes   Makes it easier to talk with people No   Partner encourages use N/A   Most friends drink or use No   To cope with family problems Yes   Afraid of withdrawal symptoms/to feel better No   Other (specify)  N/A     A. What concerns other people about your alcohol or drug use/Has anyone told you that you use too much? What did they say? (DSM)     Per clt: my children, my parents, my sisters, they have said I am a different person when I drink. They do not like it, I suffered TBI back in 2011, really bad chemical imbalance when I drink.     B. What did you think about that/ do you think you have a problem with alcohol or drug use?     Per clt:  They are 100% right. I loved first part of year, how much energy I had and focused I was with work and things I was getting done.     6. What  changes are you willing to make? What substance are you willing to stop using? How are you going to do that? Have you tried that before? What interfered with your success with that goal?      Per clt: willing to stay on the antabuse, some type of shot, that would be good for a month, willing to do that, AA, and temp sponsor, that could be sponsor, IOP treatment, and hopefully that would address that and coping skills for divorce, especially that and being alone.     7. What would be helpful to you in making this change?     Per clt: confirmed above information about question 6. No problem going through -Thursday. Normal person.     Dimension IV Ratings   Readiness for Change - The placing authority must use the criteria in Dimension IV to determine a client s readiness for change.   RISK DESCRIPTIONS - Severity ratin Client is motivated with active reinforcement, to explore treatment and strategies for change, but ambivalent about illness or need for change.    REASONS SEVERITY WAS ASSIGNED - (What information did the person provide that supports your assessment of his or her readiness to change? How aware is the person of problems caused by continued use? How willing is she or he to make changes? What does the person feel would be helpful? What has the person been able to do without help?)      Krishna reported the problem is coping with the divorce. Borat reported her children, parents, sisters have expressed concern about her use. Clt reported their concern is right. Krishna reported she is willing to take the antabuse, some type of shot (vivotrol), AA meetings, finding a sponsor, and IOP treatment. Borat reported that would all be beneficial to her. Krishna appears to be in the contemplative change of stage evidenced by her verbal report and yet reported that a trigger for her is that it is legal and she does not do any harm to anyone when she drinks in her home, and she has a right to drink.         DIMENSION V - Relapse,  Continued Use, and Continued Problem Potential   1. In what ways have you tried to control, cut-down or quit your use? If you have had periods of sobriety, how did you accomplish that? What was helpful? What happened to prevent you from continuing your sobriety? (DSM)     Per clt: Antabuse, work, keeping pre-occupied, and having people around me, AA Tuesday-Thursday, go for walk and bike ride after work, ,2-3x per week go to [place], and treat myself to coffee in morning. See below in craving section what leads back to use as explained by triggers. Quit in the past, both of us drank heavily, would tell , that we would not drink during the week, but not during the weekend, plan meals, and each day, tried the things for years, would work for three weeks and fall into drinking again. Tried many time    2. Have you experienced cravings? If yes, ask follow up questions to determine if the person recognizes triggers and if the person has had any success in dealing with them.     Yes, here and there since on antabuse, triggers, going out to restaurant where you see other people causally drinking and wishing I could, knowing that I am alone, the fact that it is legal, and I have every right to drink, being home alone that I am not hurting anyone and I enjoy it.     3. Have you been treated for alcohol/other drug abuse/dependence?     Yes.  3B. Number of times(lifetime) (over what period) 1.  3C. Number of times completed treatment (lifetime) 1.  3D. During the past three years have you participated in outpatient and/or residential?  Yes.  3E. When and where? [facility].   3F. What was helpful? What was not? Group sessions were helpful, to know that it is not just me, handouts that we went through, understanding that it is a disease, very bad disease,not known as a disease, not curable. Not helpful: they lost three staff members, fired one, people in the group, came and went, graduated as such and quit going, people  there for legal issues, most discouraging. Type of group that is more voluntary group. People there that would say they would drink then. We played games, went for walks, to often, no structure for the group. My insurance is paying for this. Three weeks at the end of the group, intent was going to go one day but the group, it  down and it was mean and about 10 guys, was terrible, swear, crack their knuckles, and sex, go play basketball. Told one on one counselor and do AA and beef it up.     4. Support group participation: Have you/do you attend support group meetings to reduce/stop your alcohol/drug use? How recently? What was your experience? Are you willing to restart? If the person has not participated, is he or she willing?      and , meetings.     5. What would assist you in staying sober/straight?     willing to stay on the antabuse, some type of shot, that would be good for a month, willing to do that, AA, and temp sponsor, that could be sponsor, [facility] treatment, and hopefully that would address that and coping skills for divorce, especially that and being alone.No problem going through -Thursday. Normal person.     Dimension V Ratings   Relapse/Continued Use/Continued problem potential - The placing authority must use the criteria in Dimension V to determine a client s relapse, continued use, and continued problem potential.   RISK DESCRIPTIONS - Severity rating: 3 Client has poor recognition and understanding of relapse and recidivism issues and displays moderately high vulnerability for further substance use or mental health problems. Client has few coping skills and rarely applies coping skills.    REASONS SEVERITY WAS ASSIGNED - (What information did the person provide that indicates his or her understanding of relapse issues? What about the person s experience indicates how prone he or she is to relapse? What coping skills does the person have that decrease relapse potential?)       Krishna reported she had quit in the past and would tell herself only drink during weekends which lasted roughly 3 weeks. Borat reported antabuse, being busy with work, AA, having people around her, and going on bike rides/walks would be ways she would abstain from use. Borat reported what lead back to use was manipulating the antabuse, being alone, thinking that alcohol is legal and she has a right to drink if she wants, also that she was not hurting anybody by drinking at home. Krishna reported one past treatment in her life that she completed. Clt reported attendance to sober support group meetings.       DIMENSION VI - Recovery Environment   1. Are you employed/attending school? Tell me about that.     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 Very supportive, missed work for 9 days recently, and they called me into HR and told me to use FMLA if needed since they knew that was not like me.    2A. Describe a typical day; evening for you. Work, school, social, leisure, volunteer, spiritual practices. Include time spent obtaining, using, recovering from drugs or alcohol. (DSM)     Per clt: FMLA currently, work 8-4:30 sometimes later until 6pm, run errands, if I need to on my way home, go home, straighten up, do dishes, go through paperwork, and iron clothes for next day, watch tv, and go to bed.     2B. How often do you spend more time than you planned using or use more than you planned? (DSM)     Per clt: both, on weekends, from Jan-June 2017, was great, prior to that and after 7.4.17, start Friday night and get up Saturday, and did stuff around house, while drinking, never had ambition to do anything myself or put myself together.     3. How important is using to your social connections? Do many of your family or friends use?     Per clt: they do, but do not have big social connections, they do but do not have to. Family events where we do not  drink. Go to [employer] with friends and out to breakfast afterwards and go for walks with friends. Group of friends that are just social.     4A. Are you currently in a significant relationship?     No    4C. Sexual Orientation:     Heterosexual    5A. Who do you live with?      No one else.     5B. Tell me about their alcohol/drug use and mental health issues.     NA    5C. Are you concerned for your safety there? No    5D. Are you concerned about the safety of anyone else who lives with you? No    6A. Do you have children who live with you?     No    6B. Do you have children who do not live with you?     Yes.  (Ask follow up questions to learn where the children are, who has custody and what the person s relationship and responsibility is with these children and what hopes the person has for his or her future with these children.) Three children, youngest 28, oldest is 37, 5 grandchildren. Middle child is 29.     7A. Who supports you in making changes in your alcohol or drug use? What are they willing to do to support you? Who is upset or angry about you making changes in your alcohol or drug use? How big a problem is this for you?      Per clt: family, is entirely on board 100%, they know what I have been struggling with. Quit in the past, both of us drank heavily, would tell , that we would not drink during the week, but not during the weekend, plan meals, and each day, tried the things for years, would work for three weeks and fall into drinking again. Tried many time.      7B. This table is provided to record information about the person s relationships and available support It is not necessary to ask each item; only to get a comprehensive picture of their support system.  How often can you count on the following people when you need someone?   Partner / Spouse N/A   Parent(s)/Aunt(s)/Uncle(s)/Grandparents Always supportive   Sibling(s)/Cousin(s) Always supportive   Child(heladio) Always supportive   Other  relative(s) N/A   Friend(s)/neighbor(s) Always supportive   Child(heladio) s father(s)/mother(s) Always supportive   Support group member(s) Always supportive   Community of rickey members Always supportive   /counselor/therapist/healer Always supportive   Other (specify) N/A     8A. What is your current living situation?     Live by myself.     8B. What is your long term plan for where you will be living?     Per clt: I rent currently, I have thought about purchasing a [email or web address], may have to wait other year due to divorce but happy where I am [employer].     8C. Tell me about your living environment/neighborhood? Ask enough follow up questions to determine safety, criminal activity, availability of alcohol and drugs, supportive or antagonistic to the person making changes.      Per clt: [place]    9. Criminal justice history: Gather current/recent history and any significant history related to substance use--Arrests? Convictions? Circumstances? Alcohol or drug involvement? Sentences? Still on probation or parole? Expectations of the court? Current court order? Any sex offenses - lifetime? What level? (DSM)    None    10. What obstacles exist to participating in treatment? (Time off work, childcare, funding, transportation, pending residential time, living situation)     None    Dimension VI Ratings   Recovery environment - The placing authority must use the criteria in Dimension VI to determine a client s recovery environment.   RISK DESCRIPTIONS - Severity ratin Client has passive social  or family and significant other are not interested in the client s recovery. The client is engaged in structured meaningful activity.    REASONS SEVERITY WAS ASSIGNED - (What support does the person have for making changes? What structure/stability does the person have in his or her daily life that will increase the likelihood that changes can be sustained? What problems exist in the person s  environment that will jeopardize getting/staying clean and sober?)     Krishna reported she works full time but has taken FMLA for admission and in the past IP treatment in 01/2017. Borat reported using is not important to her social connections. Krishna denied being in a romantic relationship currently but reported finalization of divorce in May 2017 that has been a stressor for her. Krishna reported she has three adult children who live independently. Krishna reported she lives by herself in an apartment. Borat denied past or current legals.          Client Choice/Exceptions   Would you like services specific to language, age, gender, culture, Pentecostalism preference, race, ethnicity, sexual orientation or disability?  No    What particular treatment choices and options would you like to have? IOP    Do you have a preference for a particular treatment program? [facility].     Criteria for Diagnosis     Criteria for Diagnosis  DSM-5 Criteria for Substance Use Disorder  Instructions: Determine whether the client currently meets the criteria for Substance Use Disorder using the diagnostic criteria in the DSM-V pp.481-585. Current means during the most recent 12 months outside a facility that controls access to substances    Category of Substance Severity (ICD-10 Code / DSM 5 Code)     Alcohol Use Disorder Severe  (10.20) (303.90)   Cannabis Use Disorder NA   Hallucinogen Use Disorder NA   Inhalant Use Disorder NA   Opioid Use Disorder NA   Sedative, Hypnotic, or Anxiolytic Use Disorder NA   Stimulant Related Disorder NA   Tobacco Use Disorder NA   Other (or unknown) Substance Use Disorder NA       Collateral Contact Summary   Number of contacts made: 1    Contact with referring person:  Yes, EMR.    If court related records were reviewed, summarize here: NA    Information from collateral contacts supported/largely agreed with information from the client and associated risk ratings.      Rule 25 Assessment Summary and Plan  "  's Recommendation    1. Abstain from using all non-prescribed mood altering chemicals and substances. Take all medication as prescribed and directed.  2. Follow all recommendations made by medical team during admission and at discharge.  3. Attend a co-occurring IOP treatment program such as [facility].  4. Continue to attend sober support group meetings 2x per week and obtain a female sponsor.  5. Continue care with all care providers such as a therapist and pcp and follow recommendations made by them.      Collateral Contacts     Name:    [place] Electronic Medical Record (EMR)   Relationship:    Medical Records   Phone Number:    NA Releases:    Yes     See throughout above cd assessment collateral obtained from EMR. [facility] medical chart was reviewed for collateral purposes of this assessment.     Per EMR ED admission note on 8/10/17:  \"The patients sister states she is concerned about the patients medications interacting with the alcohol. She notes the patient is paranoid that people are breaking in to her apartment; this occurs when she is not intoxicated. She states, \"She is a completely different person when not drinking\".       Collateral Contacts     Name:    [place]   Relationship:    [place]   Phone Number:    NA   Releases:    NA     [place]    ollateral Contacts      A problematic pattern of alcohol/drug use leading to clinically significant impairment or distress, as manifested by at least two of the following, occurring within a 12-month period:    Alcohol/drug is often taken in larger amounts or over a longer period than was intended.  There is a persistent desire or unsuccessful efforts to cut down or control alcohol/drug use  A great deal of time is spent in activities necessary to obtain alcohol, use alcohol, or recover from its effects.  Craving, or a strong desire or urge to use alcohol/drug  Recurrent alcohol/drug use resulting in a failure to fulfill major role obligations at work, school " or home.  Continued alcohol use despite having persistent or recurrent social or interpersonal problems caused or exacerbated by the effects of alcohol/drug.  Alcohol/drug use is continued despite knowledge of having a persistent or recurrent physical or psychological problem that is likely to have been caused or exacerbated by alcohol.  Tolerance, as defined by either of the following: A need for markedly increased amounts of alcohol/drug to achieve intoxication or desired effect. and A markedly diminished effect with continued use of the same amount of alcohol/drug.  Withdrawal, as manifested by either of the following: The characteristic withdrawal syndrome for alcohol/drug (refer to Criteria A and B of the criteria set for alcohol/drug withdrawal). and Alcohol/drug (or a closely related substance, such as a benzodiazepine) is taken to relieve or avoid withdrawal symptoms.      Specify if: In early remission:  After full criteria for alcohol/drug use disorder were previously met, none of the criteria for alcohol/drug use disorder have been met for at least 3 months but for less than 12 months (with the exception that Criterion A4,  Craving or a strong desire or urge to use alcohol/drug  may be met).     In sustained remission:   After full criteria for alcohol use disorder were previously met, non of the criteria for alcohol/drug use disorder have been met at any time during a period of 12 months or longer (with the exception that Criterion A4,  Craving or strong desire or urge to use alcohol/drug  may be met).   Specify if:   This additional specifier is used if the individual is in an environment where access to alcohol is restricted.    Mild: Presence of 2-3 symptoms    Moderate: Presence of 4-5 symptoms    Severe: Presence of 6 or more symptoms

## 2017-08-14 NOTE — PLAN OF CARE
Problem: Depressive Symptoms  Goal: Depressive Symptoms  Signs and symptoms of listed problems will be absent or manageable.   Outcome: No Change  Patient was present and social on the unit. [place] very nauseated this morning. States she felt better after she threw up. Attended groups and participated. Felt better later in the afternoon. Was social with peers. Was moved to step down unit.

## 2017-08-15 LAB
ALBUMIN UR-MCNC: NEGATIVE MG/DL
APPEARANCE UR: CLEAR
BILIRUB UR QL STRIP: NEGATIVE
COLOR UR AUTO: YELLOW
GLUCOSE UR STRIP-MCNC: NEGATIVE MG/DL
HGB UR QL STRIP: NEGATIVE
KETONES UR STRIP-MCNC: NEGATIVE MG/DL
LEUKOCYTE ESTERASE UR QL STRIP: NEGATIVE
NITRATE UR QL: NEGATIVE
PH UR STRIP: 5.5 PH (ref 5–7)
RBC #/AREA URNS AUTO: 2 /HPF (ref 0–2)
SOURCE: NORMAL
SP GR UR STRIP: 1.01 (ref 1–1.03)
SQUAMOUS #/AREA URNS AUTO: <1 /HPF (ref 0–1)
UROBILINOGEN UR STRIP-MCNC: NORMAL MG/DL (ref 0–2)
WBC #/AREA URNS AUTO: 2 /HPF (ref 0–2)

## 2017-08-15 PROCEDURE — 81001 URINALYSIS AUTO W/SCOPE: CPT | Performed by: PSYCHIATRY & NEUROLOGY

## 2017-08-15 PROCEDURE — 25000132 ZZH RX MED GY IP 250 OP 250 PS 637: Performed by: PSYCHIATRY & NEUROLOGY

## 2017-08-15 PROCEDURE — 25000132 ZZH RX MED GY IP 250 OP 250 PS 637: Performed by: PHYSICIAN ASSISTANT

## 2017-08-15 PROCEDURE — 12400000 ZZH R&B MH

## 2017-08-15 PROCEDURE — 97150 GROUP THERAPEUTIC PROCEDURES: CPT | Mod: GO

## 2017-08-15 PROCEDURE — 90853 GROUP PSYCHOTHERAPY: CPT

## 2017-08-15 RX ORDER — PHENAZOPYRIDINE HYDROCHLORIDE 100 MG/1
100 TABLET, FILM COATED ORAL 3 TIMES DAILY PRN
Status: DISCONTINUED | OUTPATIENT
Start: 2017-08-15 | End: 2017-08-18 | Stop reason: HOSPADM

## 2017-08-15 RX ORDER — DOCUSATE SODIUM 100 MG/1
100 CAPSULE, LIQUID FILLED ORAL 2 TIMES DAILY PRN
Status: DISCONTINUED | OUTPATIENT
Start: 2017-08-15 | End: 2017-08-18 | Stop reason: HOSPADM

## 2017-08-15 RX ORDER — MIRTAZAPINE 30 MG/1
30 TABLET, FILM COATED ORAL AT BEDTIME
Status: DISCONTINUED | OUTPATIENT
Start: 2017-08-15 | End: 2017-08-18 | Stop reason: HOSPADM

## 2017-08-15 RX ORDER — PHENAZOPYRIDINE HYDROCHLORIDE 100 MG/1
100 TABLET, FILM COATED ORAL
Status: DISCONTINUED | OUTPATIENT
Start: 2017-08-15 | End: 2017-08-15

## 2017-08-15 RX ADMIN — FOLIC ACID 1 MG: 1 TABLET ORAL at 08:57

## 2017-08-15 RX ADMIN — VORTIOXETINE 30 MG: 20 TABLET, FILM COATED ORAL at 08:57

## 2017-08-15 RX ADMIN — LISINOPRIL 10 MG: 10 TABLET ORAL at 08:57

## 2017-08-15 RX ADMIN — QUETIAPINE FUMARATE 25 MG: 25 TABLET, FILM COATED ORAL at 11:07

## 2017-08-15 RX ADMIN — NALTREXONE HYDROCHLORIDE 50 MG: 50 TABLET, FILM COATED ORAL at 08:57

## 2017-08-15 RX ADMIN — SULFAMETHOXAZOLE AND TRIMETHOPRIM 1 TABLET: 800; 160 TABLET ORAL at 21:15

## 2017-08-15 RX ADMIN — SULFAMETHOXAZOLE AND TRIMETHOPRIM 1 TABLET: 800; 160 TABLET ORAL at 08:57

## 2017-08-15 RX ADMIN — PHENAZOPYRIDINE HYDROCHLORIDE 100 MG: 100 TABLET, COATED ORAL at 15:38

## 2017-08-15 RX ADMIN — ARIPIPRAZOLE 5 MG: 5 TABLET ORAL at 08:57

## 2017-08-15 RX ADMIN — MULTIPLE VITAMINS W/ MINERALS TAB 1 TABLET: TAB at 08:57

## 2017-08-15 RX ADMIN — OMEPRAZOLE 20 MG: 20 CAPSULE, DELAYED RELEASE ORAL at 08:57

## 2017-08-15 RX ADMIN — DISULFIRAM 500 MG: 250 TABLET ORAL at 08:57

## 2017-08-15 RX ADMIN — MIRTAZAPINE 30 MG: 30 TABLET, FILM COATED ORAL at 21:15

## 2017-08-15 RX ADMIN — Medication 100 MG: at 08:57
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
## 2017-08-15 NOTE — PLAN OF CARE
"Problem: Depressive Symptoms  Goal: Depressive Symptoms  Signs and symptoms of listed problems will be absent or manageable.   Outcome: No Change  Pt visible around unit. Presents as anxious, tense, and depressed. During community meeting pt broke down emotionally; stated \"I randomly get very emotional and have no idea why.\" Pt later explained after breakfast and before she took her medication she felt faint, dizzy, and light headed; staff assisted patient and suggested she sit. During one-on-one pt expressed her sadness towards loosing her dog a couple months ago. Pleasant and cooperative with staff.       "

## 2017-08-15 NOTE — PLAN OF CARE
Problem: Depressive Symptoms  Goal: Depressive Symptoms  Signs and symptoms of listed problems will be absent or manageable.      Pt reports no longer feeling nauseous. Pt showed insight about her alcoholism.  Attended Exercise and wrap up group. Pt is looking forward to participating in an outpatient CD program at [facility] and reported having a generally positive attitude about the future.

## 2017-08-15 NOTE — PLAN OF CARE
Problem: Depressive Symptoms  Goal: Depressive Symptoms  Signs and symptoms of listed problems will be absent or manageable.      Pt appears to be having fluctuating blood pressure readings. In the same day, she will have a reading of 105/74 and then a few hours later will be at 135/97.  This pattern of low to high readings have been consistent over the past few days.       Pt reports no longer feeling nauseous. Pt showed insight about her alcoholism and attended Exercise and wrap up group. Pt is looking forward to participating in an outpatient CD program at [facility] and reported having a generally positive attitude about the future.

## 2017-08-16 PROCEDURE — 25000125 ZZHC RX 250: Performed by: PSYCHIATRY & NEUROLOGY

## 2017-08-16 PROCEDURE — 25000132 ZZH RX MED GY IP 250 OP 250 PS 637: Performed by: PHYSICIAN ASSISTANT

## 2017-08-16 PROCEDURE — 25000132 ZZH RX MED GY IP 250 OP 250 PS 637: Performed by: PSYCHIATRY & NEUROLOGY

## 2017-08-16 PROCEDURE — 12400000 ZZH R&B MH

## 2017-08-16 PROCEDURE — 90853 GROUP PSYCHOTHERAPY: CPT

## 2017-08-16 PROCEDURE — 97150 GROUP THERAPEUTIC PROCEDURES: CPT | Mod: GO

## 2017-08-16 RX ORDER — POLYETHYLENE GLYCOL 3350 17 G/17G
17 POWDER, FOR SOLUTION ORAL DAILY
Status: DISCONTINUED | OUTPATIENT
Start: 2017-08-16 | End: 2017-08-18 | Stop reason: HOSPADM

## 2017-08-16 RX ADMIN — ONDANSETRON 4 MG: 4 TABLET, FILM COATED ORAL at 11:22

## 2017-08-16 RX ADMIN — VORTIOXETINE 30 MG: 20 TABLET, FILM COATED ORAL at 08:53

## 2017-08-16 RX ADMIN — SULFAMETHOXAZOLE AND TRIMETHOPRIM 1 TABLET: 800; 160 TABLET ORAL at 08:54

## 2017-08-16 RX ADMIN — LISINOPRIL 10 MG: 10 TABLET ORAL at 08:53

## 2017-08-16 RX ADMIN — FOLIC ACID 1 MG: 1 TABLET ORAL at 08:53

## 2017-08-16 RX ADMIN — DOCUSATE SODIUM 100 MG: 100 CAPSULE, LIQUID FILLED ORAL at 22:38

## 2017-08-16 RX ADMIN — ARIPIPRAZOLE 5 MG: 5 TABLET ORAL at 08:53

## 2017-08-16 RX ADMIN — QUETIAPINE FUMARATE 25 MG: 25 TABLET, FILM COATED ORAL at 11:22

## 2017-08-16 RX ADMIN — POLYETHYLENE GLYCOL 3350 17 G: 17 POWDER, FOR SOLUTION ORAL at 11:17

## 2017-08-16 RX ADMIN — MIRTAZAPINE 30 MG: 30 TABLET, FILM COATED ORAL at 22:00

## 2017-08-16 RX ADMIN — MULTIPLE VITAMINS W/ MINERALS TAB 1 TABLET: TAB at 08:54

## 2017-08-16 RX ADMIN — OMEPRAZOLE 20 MG: 20 CAPSULE, DELAYED RELEASE ORAL at 08:53

## 2017-08-16 RX ADMIN — NALTREXONE HYDROCHLORIDE 50 MG: 50 TABLET, FILM COATED ORAL at 08:54

## 2017-08-16 RX ADMIN — DISULFIRAM 500 MG: 250 TABLET ORAL at 08:52
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
## 2017-08-16 NOTE — PLAN OF CARE
Problem: Depressive Symptoms  Goal: Depressive Symptoms  Signs and symptoms of listed problems will be absent or manageable.   Outcome: No Change  Pt has been present and pleasant in the SDU. Attends groups and participates accordingly. Pt presents a flat affect, but remained calm throughout the shift. Pt updated writer on a specific peer that she was worried about and demonstrated her judgement and thinking during that time; pt was worried what the peer was going to do after she was discharged; this was due to being told information about overdosing when peer got home; Pt was extremely concerned and the information was relayed to staff and Dr. Pt is respectful and polite to peers and staff and can be bright upon approach and communication.

## 2017-08-16 NOTE — PLAN OF CARE
"Problem: Depressive Symptoms  Goal: Depressive Symptoms  Signs and symptoms of listed problems will be absent or manageable.   Outcome: No Change  Pt presented with a flat affect. She appeared depressed. She was visible on the unit, and spent most of the shift working on a puzzle with a peer. She complained of feeling constipated and requested a stool softener that she took at . Pt attended wrap up group and participated with insight. She shared that she feel lost and stated \"I am like a seed that is waiting to grow.\" She also expressed feeling worried and sad that her son is shutting her out because he did not answer her call. She was encouraged to give her son time to trust and forgive her.       "

## 2017-08-17 PROCEDURE — 12400000 ZZH R&B MH

## 2017-08-17 PROCEDURE — 25000125 ZZHC RX 250: Performed by: PSYCHIATRY & NEUROLOGY

## 2017-08-17 PROCEDURE — 25000132 ZZH RX MED GY IP 250 OP 250 PS 637: Performed by: PSYCHIATRY & NEUROLOGY

## 2017-08-17 PROCEDURE — 97150 GROUP THERAPEUTIC PROCEDURES: CPT | Mod: GO

## 2017-08-17 PROCEDURE — 90853 GROUP PSYCHOTHERAPY: CPT

## 2017-08-17 RX ORDER — QUETIAPINE FUMARATE 25 MG/1
25 TABLET, FILM COATED ORAL EVERY 6 HOURS PRN
Qty: 120 TABLET | Refills: 0 | Status: SHIPPED | OUTPATIENT
Start: 2017-08-17 | End: 2017-08-18

## 2017-08-17 RX ORDER — MIRTAZAPINE 30 MG/1
30 TABLET, FILM COATED ORAL AT BEDTIME
Qty: 30 TABLET | Refills: 0 | Status: SHIPPED | OUTPATIENT
Start: 2017-08-17 | End: 2017-09-16

## 2017-08-17 RX ORDER — ONDANSETRON 4 MG/1
4 TABLET, FILM COATED ORAL EVERY 12 HOURS PRN
Qty: 60 TABLET | Refills: 0 | Status: SHIPPED | OUTPATIENT
Start: 2017-08-17 | End: 2017-09-16

## 2017-08-17 RX ORDER — ARIPIPRAZOLE 5 MG/1
5 TABLET ORAL DAILY
Qty: 30 TABLET | Refills: 0 | Status: SHIPPED | OUTPATIENT
Start: 2017-08-17 | End: 2017-09-16

## 2017-08-17 RX ORDER — DISULFIRAM 500 MG/1
500 TABLET ORAL EVERY MORNING
Qty: 30 TABLET | Refills: 0 | Status: SHIPPED | OUTPATIENT
Start: 2017-08-17 | End: 2017-09-16

## 2017-08-17 RX ADMIN — ARIPIPRAZOLE 5 MG: 5 TABLET ORAL at 09:00

## 2017-08-17 RX ADMIN — OMEPRAZOLE 20 MG: 20 CAPSULE, DELAYED RELEASE ORAL at 08:57

## 2017-08-17 RX ADMIN — POLYETHYLENE GLYCOL 3350 17 G: 17 POWDER, FOR SOLUTION ORAL at 08:56

## 2017-08-17 RX ADMIN — NALTREXONE HYDROCHLORIDE 50 MG: 50 TABLET, FILM COATED ORAL at 08:59

## 2017-08-17 RX ADMIN — DISULFIRAM 500 MG: 250 TABLET ORAL at 08:57

## 2017-08-17 RX ADMIN — FOLIC ACID 1 MG: 1 TABLET ORAL at 08:58

## 2017-08-17 RX ADMIN — MULTIPLE VITAMINS W/ MINERALS TAB 1 TABLET: TAB at 08:58

## 2017-08-17 RX ADMIN — LISINOPRIL 10 MG: 10 TABLET ORAL at 08:59

## 2017-08-17 RX ADMIN — MIRTAZAPINE 30 MG: 30 TABLET, FILM COATED ORAL at 22:12

## 2017-08-17 RX ADMIN — VORTIOXETINE 30 MG: 20 TABLET, FILM COATED ORAL at 08:59

## 2017-08-17 RX ADMIN — QUETIAPINE FUMARATE 25 MG: 25 TABLET, FILM COATED ORAL at 11:14

## 2017-08-17 RX ADMIN — ONDANSETRON 4 MG: 4 TABLET, FILM COATED ORAL at 08:09

## 2017-08-17 ASSESSMENT — ACTIVITIES OF DAILY LIVING (ADL)
ORAL_HYGIENE: INDEPENDENT
GROOMING: INDEPENDENT

## 2017-08-17 NOTE — PLAN OF CARE
Problem: Depressive Symptoms  Goal: Depressive Symptoms  Signs and symptoms of listed problems will be absent or manageable.   Outcome: No Change  Pt was in the lounge for most of the evening working on a puzzle. She was slightly social. Pt was flat, depressed but she brightened on approach.  She attended groups and she participated appropriately. Pt had a visit from her sister and it went well. She said her sister is very supportive. During a 1:1 pt talked about looking forward to attending [facility] after d/c. She said she is thankful for her employer that they have been accommodating to her. And she also talked about the need to expand her support system. Her ideas were volunteer or maybe join a interest group/club. Per Dr. Cotter she might d/c tomorrow.

## 2017-08-17 NOTE — PLAN OF CARE
Problem: Depressive Symptoms  Goal: Depressive Symptoms  Signs and symptoms of listed problems will be absent or manageable.   Outcome: Improving  Patient plans to discharge tomorrow.  She is cooperative and attending groups. Remains anxious.  Took Zofran this am and ate breakfast and then took meds without any nausea.
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
## 2017-08-18 VITALS
SYSTOLIC BLOOD PRESSURE: 127 MMHG | RESPIRATION RATE: 16 BRPM | DIASTOLIC BLOOD PRESSURE: 81 MMHG | HEART RATE: 75 BPM | TEMPERATURE: 98.2 F | BODY MASS INDEX: 23.73 KG/M2 | WEIGHT: 147 LBS | OXYGEN SATURATION: 98 %

## 2017-08-18 PROCEDURE — 25000132 ZZH RX MED GY IP 250 OP 250 PS 637: Performed by: PSYCHIATRY & NEUROLOGY

## 2017-08-18 PROCEDURE — 25000125 ZZHC RX 250: Performed by: PSYCHIATRY & NEUROLOGY

## 2017-08-18 PROCEDURE — 90853 GROUP PSYCHOTHERAPY: CPT

## 2017-08-18 RX ORDER — QUETIAPINE FUMARATE 25 MG/1
25-50 TABLET, FILM COATED ORAL EVERY 6 HOURS PRN
Qty: 120 TABLET | Refills: 0 | Status: SHIPPED | OUTPATIENT
Start: 2017-08-18

## 2017-08-18 RX ORDER — HYDROXYZINE HYDROCHLORIDE 25 MG/1
25-50 TABLET, FILM COATED ORAL EVERY 4 HOURS PRN
Qty: 120 TABLET | Refills: 1 | Status: SHIPPED | OUTPATIENT
Start: 2017-08-18

## 2017-08-18 RX ADMIN — HYDROXYZINE HYDROCHLORIDE 25 MG: 25 TABLET ORAL at 09:02

## 2017-08-18 RX ADMIN — FOLIC ACID 1 MG: 1 TABLET ORAL at 08:52

## 2017-08-18 RX ADMIN — ONDANSETRON 4 MG: 4 TABLET, FILM COATED ORAL at 08:00

## 2017-08-18 RX ADMIN — OMEPRAZOLE 20 MG: 20 CAPSULE, DELAYED RELEASE ORAL at 08:52

## 2017-08-18 RX ADMIN — MULTIPLE VITAMINS W/ MINERALS TAB 1 TABLET: TAB at 08:52

## 2017-08-18 RX ADMIN — POLYETHYLENE GLYCOL 3350 17 G: 17 POWDER, FOR SOLUTION ORAL at 08:52

## 2017-08-18 RX ADMIN — ARIPIPRAZOLE 5 MG: 5 TABLET ORAL at 08:52

## 2017-08-18 RX ADMIN — QUETIAPINE FUMARATE 25 MG: 25 TABLET, FILM COATED ORAL at 09:02

## 2017-08-18 RX ADMIN — VORTIOXETINE 30 MG: 20 TABLET, FILM COATED ORAL at 08:52

## 2017-08-18 RX ADMIN — DISULFIRAM 500 MG: 250 TABLET ORAL at 08:52

## 2017-08-18 RX ADMIN — LISINOPRIL 10 MG: 10 TABLET ORAL at 08:52

## 2017-08-18 RX ADMIN — NALTREXONE HYDROCHLORIDE 50 MG: 50 TABLET, FILM COATED ORAL at 08:53

## 2017-08-18 ASSESSMENT — ACTIVITIES OF DAILY LIVING (ADL)
GROOMING: INDEPENDENT
LAUNDRY: WITH SUPERVISION
DRESS: INDEPENDENT
ORAL_HYGIENE: INDEPENDENT
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
## 2017-08-18 NOTE — PROGRESS NOTES
Patient discharged denies suicidal ideation and has stable mood. Instructions gone over with patient regarding medications and follow up plan.  . Copies of discharge instructions ( After Visit Summary) given to patient.Patient will receive Vivitrol injection on Monday and is taking antabuse. Pt denies SI and her anxiety is under control with seroquel and vistaril.

## 2017-08-18 NOTE — PROGRESS NOTES
Patient attended Process Group and participated appropriately. Patient demonstrated good insight into the topic of discussion.

## 2017-08-18 NOTE — PLAN OF CARE
"Problem: Depressive Symptoms  Goal: Depressive Symptoms  Signs and symptoms of listed problems will be absent or manageable.   Outcome: Improving  Pt spent her entire evening in the lounge working on puzzles. During a 1:1 pt said she had a \"great day\", mainly because she had several encouraging phone calls from family and a friend. Pt said she is looking forward to d/c tomorrow. She said her weekend is already planned and she is hoping to adopt a cat. Pt had no complaints of anxiety or nausea this evening. She attended both groups and she participated. Pt was blunt and flat but she would brighten on approach.      "

## 2017-08-18 NOTE — DISCHARGE INSTRUCTIONS
Behavioral Discharge Planning and Instructions    Summary:  Admitted with worsening depression     Main Diagnosis:  Major Depression, recurrent, severe; PTSD; Alcohol Dependence; TBI by history    Major Treatments, Procedures and Findings:   Psychiatric assessment. Chemical dependency assessment. Medication adjustment.     Symptoms to Report: Losing more sleep, Mood getting worse or Thoughts of suicide    Lifestyle Adjustment:  Follow all treatment recommendations. Develop and follow safety plan.  Abstain from the use of all mood-altering substances, including alcohol, as usage may increase symptoms of depression. Maintain sobriety by attending daily AA or NA meetings and obtaining a sponsor.  Utilize positive coping skills to manage depressive symptoms.     Psychiatry Follow-up:     You have psychiatry follow up appointments scheduled with Estefania Malin RN, CNS at [facility] on Wednesday, August 30, 2017 at 2:00 pm and on Wednesday, September 6, 2017 at 1:20 pm.     Please go to [facility] on Monday morning to get your Vivitrol injection. The clinic is open from 9:00 am until 4:00 pm.         [facility]  [address], [address]  [place] [address]  Phone:  [phone] / Fax:  [phone]    You are being referred to the [facility] Intensive Outpatient Program at [facility]. The  for [facility] is out of the office until Monday. A message was left to have her call you on Monday. If you do not hear anything from [facility] by Monday afternoon, please call and speak to Geeta. Her number is [phone]. You will likely start the IOP the day after your intake. The IOP meets Monday through Friday from 9am until 12noon.     [facility]  [address]  [place]  [address]  Phone:  [phone] / Fax:  [phone]    Resources:   Crisis Intervention: 387.194.8605 or 729-123-1958 (TTY:  555.686.2539).  Call anytime for help.  National [place] on Mental Illness (www.[place].jian.org): 521.413.2250 or 964-204-2150.  Alcoholics Anonymous (www.alcoholics-anonymous.org): Check your phone book for your local chapter.  National Suicide Prevention Line (www.mentalhealthmn.org): 705-281-RGNE (2334)  [facility] - Mental Health Crisis  8:00 AM - 4:30 PM call [phone] for crisis appointment. From 4:30 PM to 8:00 AM call [phone] for the mobile crisis service for emergency mental health services.    General Medication Instructions:   See your medication sheet(s) for instructions.   Take all medicines as directed.  Make no changes unless your doctor suggests them.   Go to all your doctor visits.  Be sure to have all your required lab tests. This way, your medicines can be refilled on time.  Do not use any drugs not prescribed by your doctor.  Avoid alcohol.

## 2017-08-19 NOTE — PROGRESS NOTES
Pt left [address] ambulatory at 1730 with her sister to go home via private vehicle. Pt had packed her belongings in a paper bag which was placed in her locker, but at time of discharge could not locate her green folder. A new copy of discharge instructions was printed and given to pt , along with Xeroxed copy of note for work, and a new green packet. Discharge complete.

## 2019-02-01 ENCOUNTER — VIRTUAL VISIT (OUTPATIENT)
Dept: FAMILY MEDICINE | Facility: OTHER | Age: 59
End: 2019-02-01

## 2019-02-01 NOTE — PROGRESS NOTES
"Date:   Clinician: Pratibha Jarvis  Clinician NPI: [id number]  Patient: Isabel Aviles  Patient : 1960  Patient Address: [facility] [place] [address]  Patient Phone: [phone]  Visit Protocol: Yeast infection  Patient Summary:  Isabel is a 58 year old ( : 1960 ) female who initiated a Visit for a presumed vaginal yeast infection. When asked the question \"Please sign me up to receive news, health information and promotions from [email or web address].\", Isabel responded \"No\".    Isabel began noticing vaginal discharge, vaginal pruritus, and perivulvar pruritus 5-10 days ago. She has a more than normal amount of thin, clear or white, non-odorous, smooth discharge.   She denies having abdominal pain, perivulvar rash, and open sores. She also denies feeling feverish.   Isabel has a history of vaginal yeast infections. She has had zero (0) occurrences in the past year and the current symptoms are similar to previous yeast infections. She has not used fluconazole (Diflucan) to treat previous yeast infections.   She has not tried to treat her current symptoms with any medication.   She prefers a fluconazole (Diflucan) Pill.   She denies taking antibiotics in the past 2 weeks. She denies having a sexually transmitted disease.   She does NOT smoke or use smokeless tobacco.    MEDICATIONS: Lopreeza oral, lisinopril oral, ALLERGIES: NKDA  Clinician Response:  Dear Isabel,  Based on the information you have provided, you likely have a vaginal yeast infection which is a common infection of the vagina caused by a fungus.  I am prescribing:   Fluconazole (Diflucan) 150 mg oral tablet. Take 1 tablet by mouth in a single dose. There are no refills with this prescription.  While you have yeast infection symptoms, do the following:      Avoid irritants such as scented bath products, tampons, pads, or vaginal sprays and powders.    Avoid douching.    Wear cotton underwear and if you are comfortable doing so, do " not wear underwear to bed.    Avoid hot tubs and whirlpool spas.     Symptoms should improve with 1-2 days of treatment and resolve entirely in 5-7 days. However, there are other types of vaginal infections that have some of the same symptoms as a yeast infection. For this reason, please be seen in person if symptoms have not improved after 3 days or resolved after 10 days.   Diagnosis: Candida vulvovaginitis  Diagnosis ICD: B37.3  Prescription: fluconazole (Diflucan) 150 mg oral tablet 1 tablet, 1 days supply. Take 1 tablet by mouth in a single dose. Refills: 0, Refill as needed: no, Allow substitutions: yes  Pharmacy: [id number] - [phone] - [address] [place] [address]

## 2020-11-19 NOTE — IP AVS SNAPSHOT
MRN:[id number]                      After Visit Summary   1/3/2017    Isabel Fallon    MRN: [id number]           Thank you!     Thank you for choosing [facility] for your care. Our goal is always to provide you with excellent care.        Patient Information     Date Of Birth          1960        About your hospital stay     You were admitted on:  January 3, 2017 You last received care in the:  [facility]    You were discharged on:  January 9, 2017       Who to Call     For medical emergencies, please call 911.  For non-urgent questions about your medical care, please call your primary care provider or clinic, [phone]          Attending Provider     Provider    Gaby Riggs MD [place], Annabel Howe MD       Primary Care Provider Office Phone # Fax #    Dee Dee Miki Gallegos [phone]       [place] [facility] [address]  [place] [address]        Further instructions from your care team       Behavioral Discharge Planning and Instructions    Summary: Admitted to hospital with suicidal ideation.    Main Diagnosis:      Major Treatments, Procedures and Findings: psychiatric assessment    Symptoms to Report: feeling more aggressive, losing more sleep, mood getting worse or thoughts of suicide    Lifestyle Adjustment: Develop and follow safety plan. Follow through with therapy and mdication management . Follow up with chemical health as recommended by .    Psychiatry Follow-up:     Program intake on Tuesday 1/10/17 @ 9 am. Bring insurance card and ID.  [facility]  [address]  [place] [id number] [phone] fax: [phone]    KARINE Chapa- appointment on Monday 1/30/17 @ 1:30 for paperwork and 2 pm for medication management interview.  [facility]  [address] [address] [phone]  Fax: [phone]    Juan Miguel Jara, therapist- appointment on Wednesday 1/11/17 @ 4 pm.  Nori Fuller, therapist - appointment on Friday 1/13/17 @ 2:45 pm.  [facility]  [address]  [place] [address]  [phone] fax: [phone]    Resources:   Crisis Intervention: 721.631.1850 or 255-297-1800 (TTY: 631.894.7529).  Call anytime for help.  [employer] [place] [employer] Mental Illness (www.mn.jian.org): 345.728.9028 or 178-887-5602.  Alcoholics Anonymous (www.alcoholics-anonymous.org): Check your phone book for your local chapter.  National Suicide Prevention Line (www.mentalhealthmn.org): 267-934-WEFF (5723)    General Medication Instructions:   See your medication sheet(s) for instructions.   Take all medicines as directed.  Make no changes unless your doctor suggests them.   Go to all your doctor visits.  Be sure to have all your required lab tests. This way, your medicines can be refilled on time.  Do not use any drugs not prescribed by your doctor.  Avoid alcohol.        Pending Results     No orders found from 1/2/2017 to 1/4/2017.            Statement of Approval     Ordered          01/09/17 1052  I have reviewed and agree with all the recommendations and orders detailed in this document.   EFFECTIVE NOW     Approved and electronically signed by:  Annabel Choe MD             Admission Information        Provider Department Dept Phone    1/3/2017 Annabel Choe MD [facility] [phone]      Your Vitals Were     Blood Pressure Pulse Temperature    143/97 mmHg 77 97.5  F (36.4  C) (Oral)    Respirations Height Weight    16 1.676 m (5' 6\") 62.234 kg (137 lb 3.2 oz)    BMI (Body Mass Index) Pulse Oximetry       22.16 kg/m2 98%       MyChart Information     [facility] lets you send messages to your doctor, view your test results, renew your prescriptions, schedule appointments and more. To sign up, go to [email or web address] . Click on \"Log in\" on the left side " "of the screen, which will take you to the Welcome page. Then click on \"Sign up Now\" on the right side of the page.     You will be asked to enter the access code listed below, as well as some personal information. Please follow the directions to create your username and password.     Your access code is: [email or web address]  Expires: 2017 12:48 PM     Your access code will  in 90 days. If you need help or a new code, please call your [facility] clinic or [phone].        Care EveryWhere ID     This is your Care EveryWhere ID. This could be used by other organizations to access your [facility] medical records  [id number]           Review of your medicines      START taking        Dose / Directions    disulfiram 250 MG tablet   Commonly known as:  ANTABUSE   Used for:  Alcohol dependence, continuous (H)        Dose:  250 mg   Take 1 tablet (250 mg) by mouth daily   Quantity:  30 tablet   Refills:  0       hydrOXYzine 25 MG tablet   Commonly known as:  ATARAX   Used for:  Generalized anxiety disorder        Dose:  25-50 mg   Take 1-2 tablets (25-50 mg) by mouth every 8 hours as needed for anxiety   Quantity:  120 tablet   Refills:  0       mirtazapine 15 MG tablet   Commonly known as:  REMERON   Used for:  Severe episode of recurrent major depressive disorder, without psychotic features (H)        Dose:  15 mg   Take 1 tablet (15 mg) by mouth At Bedtime   Quantity:  30 tablet   Refills:  0       QUEtiapine 25 MG tablet   Commonly known as:  SEROquel   Used for:  Severe episode of recurrent major depressive disorder, without psychotic features (H), Generalized anxiety disorder        Dose:  25 mg   Take 1 tablet (25 mg) by mouth every 6 hours as needed (anxiety)   Quantity:  60 tablet   Refills:  0       vortioxetine 10 MG tablet   Commonly known as:  TRINTELLIX/BRINTELLIX   Used for:  Severe episode of recurrent major depressive disorder, without psychotic features (H)        Dose:  10 mg   Take 1 tablet (10 mg) by " mouth daily   Quantity:  30 tablet   Refills:  0         CONTINUE these medicines which have NOT CHANGED        Dose / Directions    butalbital-acetaminophen-caffeine -40 MG per tablet   Commonly known as:  FIORICET/ESGIC        Dose:  1 tablet   Take 1 tablet by mouth every 4 hours as needed for migraine   Refills:  0       lisinopril 10 MG tablet   Commonly known as:  PRINIVIL/ZESTRIL        Dose:  10 mg   Take 10 mg by mouth daily.   Refills:  0       priLOSEC 20 MG CR capsule   Generic drug:  omeprazole        Dose:  20 mg   Take 20 mg by mouth daily.   Refills:  0       ZOFRAN PO        Dose:  4 mg   Take 4 mg by mouth every 8 hours as needed.   Refills:  0         STOP taking     XANAX PO                Where to get your medicines      These medications were sent to [facility] [place] [facility] - [address]  [address], [place] [address]     Phone:  [phone]    - disulfiram 250 MG tablet  - hydrOXYzine 25 MG tablet  - mirtazapine 15 MG tablet  - QUEtiapine 25 MG tablet  - vortioxetine 10 MG tablet             Protect others around you: Learn how to safely use, store and throw away your medicines at www.disposemymeds.org.             Medication List: This is a list of all your medications and when to take them. Check marks below indicate your daily home schedule. Keep this list as a reference.      Medications           Morning Afternoon Evening Bedtime As Needed    butalbital-acetaminophen-caffeine -40 MG per tablet   Commonly known as:  FIORICET/ESGIC   Take 1 tablet by mouth every 4 hours as needed for migraine                                   disulfiram 250 MG tablet   Commonly known as:  ANTABUSE   Take 1 tablet (250 mg) by mouth daily                                   hydrOXYzine 25 MG tablet   Commonly known as:  ATARAX   Take 1-2 tablets (25-50 mg) by mouth every 8 hours as needed for anxiety                                   lisinopril 10 MG tablet    Commonly known as:  PRINIVIL/ZESTRIL   Take 10 mg by mouth daily.   Last time this was given:  10 mg on 1/9/2017  8:10 AM                                   mirtazapine 15 MG tablet   Commonly known as:  REMERON   Take 1 tablet (15 mg) by mouth At Bedtime   Last time this was given:  15 mg on 1/8/2017 10:00 PM                                priLOSEC 20 MG CR capsule   Take 20 mg by mouth daily.   Last time this was given:  20 mg on 1/9/2017  8:10 AM   Generic drug:  omeprazole                                   QUEtiapine 25 MG tablet   Commonly known as:  SEROquel   Take 1 tablet (25 mg) by mouth every 6 hours as needed (anxiety)   Last time this was given:  50 mg on 1/9/2017  9:08 AM                                   vortioxetine 10 MG tablet   Commonly known as:  TRINTELLIX/BRINTELLIX   Take 1 tablet (10 mg) by mouth daily   Last time this was given:  10 mg on 1/9/2017  8:10 AM                                   ZOFRAN PO   Take 4 mg by mouth every 8 hours as needed.   Last time this was given:  4 mg on 1/3/2017 10:12 PM                                      no
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
